# Patient Record
Sex: FEMALE | Race: WHITE | NOT HISPANIC OR LATINO | Employment: FULL TIME | URBAN - METROPOLITAN AREA
[De-identification: names, ages, dates, MRNs, and addresses within clinical notes are randomized per-mention and may not be internally consistent; named-entity substitution may affect disease eponyms.]

---

## 2020-11-13 ENCOUNTER — TRANSCRIBE ORDERS (OUTPATIENT)
Dept: ADMINISTRATIVE | Facility: HOSPITAL | Age: 55
End: 2020-11-13

## 2020-11-13 ENCOUNTER — TRANSCRIBE ORDERS (OUTPATIENT)
Dept: RADIOLOGY | Facility: HOSPITAL | Age: 55
End: 2020-11-13

## 2020-11-13 DIAGNOSIS — Z13.9 SCREENING FOR UNSPECIFIED CONDITION: ICD-10-CM

## 2020-11-13 DIAGNOSIS — Z13.1 SCREENING FOR DIABETES MELLITUS: ICD-10-CM

## 2020-11-13 DIAGNOSIS — Z13.6 SCREENING FOR CARDIOVASCULAR CONDITION: ICD-10-CM

## 2020-11-13 DIAGNOSIS — Z13.21 SCREENING FOR ENDOCRINE, NUTRITIONAL, METABOLIC AND IMMUNITY DISORDER: ICD-10-CM

## 2020-11-13 DIAGNOSIS — Z12.31 OTHER SCREENING MAMMOGRAM: Primary | ICD-10-CM

## 2020-11-13 DIAGNOSIS — R94.31 ABNORMAL ELECTROCARDIOGRAM: Primary | ICD-10-CM

## 2020-11-13 DIAGNOSIS — Z13.0 SCREENING FOR ENDOCRINE, NUTRITIONAL, METABOLIC AND IMMUNITY DISORDER: ICD-10-CM

## 2020-11-13 DIAGNOSIS — Z13.228 SCREENING FOR ENDOCRINE, NUTRITIONAL, METABOLIC AND IMMUNITY DISORDER: ICD-10-CM

## 2020-11-13 DIAGNOSIS — Z13.29 SCREENING FOR ENDOCRINE, NUTRITIONAL, METABOLIC AND IMMUNITY DISORDER: ICD-10-CM

## 2020-11-16 ENCOUNTER — LAB (OUTPATIENT)
Dept: LAB | Facility: HOSPITAL | Age: 55
End: 2020-11-16
Payer: COMMERCIAL

## 2020-11-16 DIAGNOSIS — Z13.29 SCREENING FOR ENDOCRINE, NUTRITIONAL, METABOLIC AND IMMUNITY DISORDER: ICD-10-CM

## 2020-11-16 DIAGNOSIS — Z13.0 SCREENING FOR ENDOCRINE, NUTRITIONAL, METABOLIC AND IMMUNITY DISORDER: ICD-10-CM

## 2020-11-16 DIAGNOSIS — Z13.1 SCREENING FOR DIABETES MELLITUS: ICD-10-CM

## 2020-11-16 DIAGNOSIS — Z13.228 SCREENING FOR ENDOCRINE, NUTRITIONAL, METABOLIC AND IMMUNITY DISORDER: ICD-10-CM

## 2020-11-16 DIAGNOSIS — Z13.21 SCREENING FOR ENDOCRINE, NUTRITIONAL, METABOLIC AND IMMUNITY DISORDER: ICD-10-CM

## 2020-11-16 DIAGNOSIS — Z13.6 SCREENING FOR CARDIOVASCULAR CONDITION: ICD-10-CM

## 2020-11-16 DIAGNOSIS — Z13.9 SCREENING FOR UNSPECIFIED CONDITION: ICD-10-CM

## 2020-11-16 LAB
25(OH)D3 SERPL-MCNC: 15.3 NG/ML (ref 30–100)
ALBUMIN SERPL BCP-MCNC: 4.4 G/DL (ref 3.4–4.8)
ALP SERPL-CCNC: 62.9 U/L (ref 35–140)
ALT SERPL W P-5'-P-CCNC: 20 U/L (ref 5–54)
ANION GAP SERPL CALCULATED.3IONS-SCNC: 8 MMOL/L (ref 4–13)
AST SERPL W P-5'-P-CCNC: 15 U/L (ref 15–41)
BASOPHILS # BLD AUTO: 0.03 THOUSANDS/ΜL (ref 0–0.1)
BASOPHILS NFR BLD AUTO: 1 % (ref 0–1)
BILIRUB SERPL-MCNC: 0.61 MG/DL (ref 0.3–1.2)
BUN SERPL-MCNC: 15 MG/DL (ref 6–20)
CALCIUM SERPL-MCNC: 9.5 MG/DL (ref 8.4–10.2)
CHLORIDE SERPL-SCNC: 104 MMOL/L (ref 96–108)
CHOLEST SERPL-MCNC: 162 MG/DL
CO2 SERPL-SCNC: 27 MMOL/L (ref 22–33)
CREAT SERPL-MCNC: 0.7 MG/DL (ref 0.4–1.1)
EOSINOPHIL # BLD AUTO: 0.14 THOUSAND/ΜL (ref 0–0.61)
EOSINOPHIL NFR BLD AUTO: 2 % (ref 0–6)
ERYTHROCYTE [DISTWIDTH] IN BLOOD BY AUTOMATED COUNT: 12.8 % (ref 11.6–15.1)
EST. AVERAGE GLUCOSE BLD GHB EST-MCNC: 105 MG/DL
GFR SERPL CREATININE-BSD FRML MDRD: 99 ML/MIN/1.73SQ M
GLUCOSE P FAST SERPL-MCNC: 85 MG/DL (ref 70–105)
HBA1C MFR BLD: 5.3 %
HCT VFR BLD AUTO: 47.3 % (ref 34.8–46.1)
HCV AB SER QL: NORMAL
HDLC SERPL-MCNC: 50 MG/DL
HGB BLD-MCNC: 15.8 G/DL (ref 11.5–15.4)
IMM GRANULOCYTES # BLD AUTO: 0.01 THOUSAND/UL (ref 0–0.2)
IMM GRANULOCYTES NFR BLD AUTO: 0 % (ref 0–2)
LDLC SERPL CALC-MCNC: 99 MG/DL (ref 0–100)
LYMPHOCYTES # BLD AUTO: 1.32 THOUSANDS/ΜL (ref 0.6–4.47)
LYMPHOCYTES NFR BLD AUTO: 21 % (ref 14–44)
MCH RBC QN AUTO: 30.8 PG (ref 26.8–34.3)
MCHC RBC AUTO-ENTMCNC: 33.4 G/DL (ref 31.4–37.4)
MCV RBC AUTO: 92 FL (ref 82–98)
MONOCYTES # BLD AUTO: 0.73 THOUSAND/ΜL (ref 0.17–1.22)
MONOCYTES NFR BLD AUTO: 12 % (ref 4–12)
NEUTROPHILS # BLD AUTO: 4.13 THOUSANDS/ΜL (ref 1.85–7.62)
NEUTS SEG NFR BLD AUTO: 64 % (ref 43–75)
NONHDLC SERPL-MCNC: 112 MG/DL
PLATELET # BLD AUTO: 243 THOUSANDS/UL (ref 149–390)
PMV BLD AUTO: 10.4 FL (ref 8.9–12.7)
POTASSIUM SERPL-SCNC: 4.3 MMOL/L (ref 3.5–5)
PROT SERPL-MCNC: 7.4 G/DL (ref 6.4–8.3)
RBC # BLD AUTO: 5.13 MILLION/UL (ref 3.81–5.12)
SODIUM SERPL-SCNC: 139 MMOL/L (ref 133–145)
TRIGL SERPL-MCNC: 64.6 MG/DL
TSH SERPL DL<=0.05 MIU/L-ACNC: 1.77 UIU/ML (ref 0.34–5.6)
WBC # BLD AUTO: 6.36 THOUSAND/UL (ref 4.31–10.16)

## 2020-11-16 PROCEDURE — 86803 HEPATITIS C AB TEST: CPT

## 2020-11-16 PROCEDURE — 36415 COLL VENOUS BLD VENIPUNCTURE: CPT

## 2020-11-16 PROCEDURE — 80053 COMPREHEN METABOLIC PANEL: CPT

## 2020-11-16 PROCEDURE — 85025 COMPLETE CBC W/AUTO DIFF WBC: CPT

## 2020-11-16 PROCEDURE — 84443 ASSAY THYROID STIM HORMONE: CPT

## 2020-11-16 PROCEDURE — 82306 VITAMIN D 25 HYDROXY: CPT

## 2020-11-16 PROCEDURE — 80061 LIPID PANEL: CPT

## 2020-11-16 PROCEDURE — 87389 HIV-1 AG W/HIV-1&-2 AB AG IA: CPT

## 2020-11-16 PROCEDURE — 83036 HEMOGLOBIN GLYCOSYLATED A1C: CPT

## 2020-11-17 LAB — HIV 1+2 AB+HIV1 P24 AG SERPL QL IA: NORMAL

## 2021-06-24 ENCOUNTER — APPOINTMENT (OUTPATIENT)
Dept: LAB | Facility: HOSPITAL | Age: 56
End: 2021-06-24
Payer: COMMERCIAL

## 2021-06-24 DIAGNOSIS — I10 HYPERTENSION, ESSENTIAL: ICD-10-CM

## 2021-06-24 LAB
ANION GAP SERPL CALCULATED.3IONS-SCNC: 8 MMOL/L (ref 4–13)
BUN SERPL-MCNC: 17 MG/DL (ref 6–20)
CALCIUM SERPL-MCNC: 9.4 MG/DL (ref 8.4–10.2)
CHLORIDE SERPL-SCNC: 104 MMOL/L (ref 96–108)
CO2 SERPL-SCNC: 25 MMOL/L (ref 22–33)
CREAT SERPL-MCNC: 0.74 MG/DL (ref 0.4–1.1)
GFR SERPL CREATININE-BSD FRML MDRD: 92 ML/MIN/1.73SQ M
GLUCOSE P FAST SERPL-MCNC: 98 MG/DL (ref 70–105)
MAGNESIUM SERPL-MCNC: 2 MG/DL (ref 1.6–2.6)
POTASSIUM SERPL-SCNC: 3.7 MMOL/L (ref 3.5–5)
SODIUM SERPL-SCNC: 137 MMOL/L (ref 133–145)

## 2021-06-24 PROCEDURE — 80048 BASIC METABOLIC PNL TOTAL CA: CPT

## 2021-06-24 PROCEDURE — 36415 COLL VENOUS BLD VENIPUNCTURE: CPT

## 2021-06-24 PROCEDURE — 83735 ASSAY OF MAGNESIUM: CPT

## 2021-09-08 ENCOUNTER — APPOINTMENT (OUTPATIENT)
Dept: LAB | Facility: HOSPITAL | Age: 56
End: 2021-09-08
Payer: COMMERCIAL

## 2021-09-08 DIAGNOSIS — F39 MILD MOOD DISORDER (HCC): ICD-10-CM

## 2021-09-08 LAB
ALBUMIN SERPL BCP-MCNC: 4.3 G/DL (ref 3.4–4.8)
ALP SERPL-CCNC: 50.3 U/L (ref 35–140)
ALT SERPL W P-5'-P-CCNC: 11 U/L (ref 5–54)
ANION GAP SERPL CALCULATED.3IONS-SCNC: 8 MMOL/L (ref 4–13)
AST SERPL W P-5'-P-CCNC: 17 U/L (ref 15–41)
BASOPHILS # BLD AUTO: 0.03 THOUSANDS/ΜL (ref 0–0.1)
BASOPHILS NFR BLD AUTO: 0 % (ref 0–1)
BILIRUB SERPL-MCNC: 0.99 MG/DL (ref 0.3–1.2)
BUN SERPL-MCNC: 15 MG/DL (ref 6–20)
CALCIUM SERPL-MCNC: 9.3 MG/DL (ref 8.4–10.2)
CHLORIDE SERPL-SCNC: 104 MMOL/L (ref 96–108)
CHOLEST SERPL-MCNC: 146 MG/DL
CO2 SERPL-SCNC: 26 MMOL/L (ref 22–33)
CREAT SERPL-MCNC: 0.84 MG/DL (ref 0.4–1.1)
EOSINOPHIL # BLD AUTO: 0.15 THOUSAND/ΜL (ref 0–0.61)
EOSINOPHIL NFR BLD AUTO: 2 % (ref 0–6)
ERYTHROCYTE [DISTWIDTH] IN BLOOD BY AUTOMATED COUNT: 12.9 % (ref 11.6–15.1)
EST. AVERAGE GLUCOSE BLD GHB EST-MCNC: 100 MG/DL
GFR SERPL CREATININE-BSD FRML MDRD: 79 ML/MIN/1.73SQ M
GLUCOSE P FAST SERPL-MCNC: 89 MG/DL (ref 70–105)
HBA1C MFR BLD: 5.1 %
HCT VFR BLD AUTO: 46.9 % (ref 34.8–46.1)
HDLC SERPL-MCNC: 40 MG/DL
HGB BLD-MCNC: 15.5 G/DL (ref 11.5–15.4)
IMM GRANULOCYTES # BLD AUTO: 0.01 THOUSAND/UL (ref 0–0.2)
IMM GRANULOCYTES NFR BLD AUTO: 0 % (ref 0–2)
LDLC SERPL CALC-MCNC: 96 MG/DL (ref 0–100)
LYMPHOCYTES # BLD AUTO: 1.88 THOUSANDS/ΜL (ref 0.6–4.47)
LYMPHOCYTES NFR BLD AUTO: 22 % (ref 14–44)
MCH RBC QN AUTO: 31.4 PG (ref 26.8–34.3)
MCHC RBC AUTO-ENTMCNC: 33 G/DL (ref 31.4–37.4)
MCV RBC AUTO: 95 FL (ref 82–98)
MONOCYTES # BLD AUTO: 0.61 THOUSAND/ΜL (ref 0.17–1.22)
MONOCYTES NFR BLD AUTO: 7 % (ref 4–12)
NEUTROPHILS # BLD AUTO: 5.77 THOUSANDS/ΜL (ref 1.85–7.62)
NEUTS SEG NFR BLD AUTO: 69 % (ref 43–75)
NONHDLC SERPL-MCNC: 106 MG/DL
PLATELET # BLD AUTO: 241 THOUSANDS/UL (ref 149–390)
PMV BLD AUTO: 10.7 FL (ref 8.9–12.7)
POTASSIUM SERPL-SCNC: 4.3 MMOL/L (ref 3.5–5)
PROT SERPL-MCNC: 7.2 G/DL (ref 6.4–8.3)
RBC # BLD AUTO: 4.94 MILLION/UL (ref 3.81–5.12)
SODIUM SERPL-SCNC: 138 MMOL/L (ref 133–145)
TRIGL SERPL-MCNC: 51.2 MG/DL
TSH SERPL DL<=0.05 MIU/L-ACNC: 1.04 UIU/ML (ref 0.34–5.6)
WBC # BLD AUTO: 8.45 THOUSAND/UL (ref 4.31–10.16)

## 2021-09-08 PROCEDURE — 85025 COMPLETE CBC W/AUTO DIFF WBC: CPT

## 2021-09-08 PROCEDURE — 80053 COMPREHEN METABOLIC PANEL: CPT

## 2021-09-08 PROCEDURE — 83036 HEMOGLOBIN GLYCOSYLATED A1C: CPT

## 2021-09-08 PROCEDURE — 84443 ASSAY THYROID STIM HORMONE: CPT

## 2021-09-08 PROCEDURE — 80061 LIPID PANEL: CPT

## 2021-09-08 PROCEDURE — 36415 COLL VENOUS BLD VENIPUNCTURE: CPT

## 2022-01-31 ENCOUNTER — HOSPITAL ENCOUNTER (EMERGENCY)
Facility: HOSPITAL | Age: 57
Discharge: HOME | End: 2022-01-31
Attending: INTERNAL MEDICINE | Admitting: INTERNAL MEDICINE
Payer: MEDICARE

## 2022-01-31 VITALS
DIASTOLIC BLOOD PRESSURE: 93 MMHG | BODY MASS INDEX: 27.31 KG/M2 | SYSTOLIC BLOOD PRESSURE: 186 MMHG | TEMPERATURE: 97.3 F | HEART RATE: 68 BPM | HEIGHT: 64 IN | WEIGHT: 160 LBS | OXYGEN SATURATION: 97 % | RESPIRATION RATE: 20 BRPM

## 2022-01-31 DIAGNOSIS — R11.2 NAUSEA AND VOMITING, INTRACTABILITY OF VOMITING NOT SPECIFIED, UNSPECIFIED VOMITING TYPE: Primary | ICD-10-CM

## 2022-01-31 LAB
ALBUMIN SERPL BCP-MCNC: 4.3 G/DL (ref 3.4–4.8)
ALP SERPL-CCNC: 54.4 U/L (ref 35–140)
ALT SERPL W P-5'-P-CCNC: 13 U/L (ref 5–54)
ANION GAP SERPL CALCULATED.3IONS-SCNC: 6 MMOL/L (ref 4–13)
AST SERPL W P-5'-P-CCNC: 15 U/L (ref 15–41)
BASOPHILS # BLD AUTO: 0.02 THOUSANDS/ΜL (ref 0–0.1)
BASOPHILS NFR BLD AUTO: 0 % (ref 0–1)
BILIRUB SERPL-MCNC: 1.25 MG/DL (ref 0.3–1.2)
BUN SERPL-MCNC: 11 MG/DL (ref 6–20)
CALCIUM SERPL-MCNC: 9.5 MG/DL (ref 8.4–10.2)
CHLORIDE SERPL-SCNC: 102 MMOL/L (ref 96–108)
CO2 SERPL-SCNC: 28 MMOL/L (ref 22–33)
CREAT SERPL-MCNC: 0.75 MG/DL (ref 0.4–1.1)
EOSINOPHIL # BLD AUTO: 0.11 THOUSAND/ΜL (ref 0–0.61)
EOSINOPHIL NFR BLD AUTO: 1 % (ref 0–6)
ERYTHROCYTE [DISTWIDTH] IN BLOOD BY AUTOMATED COUNT: 13.1 % (ref 11.6–15.1)
FLUAV RNA RESP QL NAA+PROBE: NEGATIVE
FLUBV RNA RESP QL NAA+PROBE: NEGATIVE
GFR SERPL CREATININE-BSD FRML MDRD: 90 ML/MIN/1.73SQ M
GLUCOSE SERPL-MCNC: 96 MG/DL (ref 65–140)
HCT VFR BLD AUTO: 45.5 % (ref 34.8–46.1)
HGB BLD-MCNC: 15.2 G/DL (ref 11.5–15.4)
IMM GRANULOCYTES # BLD AUTO: 0.02 THOUSAND/UL (ref 0–0.2)
IMM GRANULOCYTES NFR BLD AUTO: 0 % (ref 0–2)
LIPASE SERPL-CCNC: 14 U/L (ref 13–60)
LYMPHOCYTES # BLD AUTO: 1.59 THOUSANDS/ΜL (ref 0.6–4.47)
LYMPHOCYTES NFR BLD AUTO: 19 % (ref 14–44)
MCH RBC QN AUTO: 30.5 PG (ref 26.8–34.3)
MCHC RBC AUTO-ENTMCNC: 33.4 G/DL (ref 31.4–37.4)
MCV RBC AUTO: 91 FL (ref 82–98)
MONOCYTES # BLD AUTO: 0.58 THOUSAND/ΜL (ref 0.17–1.22)
MONOCYTES NFR BLD AUTO: 7 % (ref 4–12)
NEUTROPHILS # BLD AUTO: 5.98 THOUSANDS/ΜL (ref 1.85–7.62)
NEUTS SEG NFR BLD AUTO: 73 % (ref 43–75)
NRBC BLD AUTO-RTO: 0 /100 WBCS
PLATELET # BLD AUTO: 214 THOUSANDS/UL (ref 149–390)
PMV BLD AUTO: 9.6 FL (ref 8.9–12.7)
POTASSIUM SERPL-SCNC: 4 MMOL/L (ref 3.5–5)
PROT SERPL-MCNC: 7.2 G/DL (ref 6.4–8.3)
RBC # BLD AUTO: 4.98 MILLION/UL (ref 3.81–5.12)
SARS-COV-2 AG UPPER RESP QL IA: NORMAL
SARS-COV-2 RNA RESP QL NAA+PROBE: NEGATIVE
SODIUM SERPL-SCNC: 136 MMOL/L (ref 133–145)
WBC # BLD AUTO: 8.3 THOUSAND/UL (ref 4.31–10.16)

## 2022-01-31 PROCEDURE — 87636 SARSCOV2 & INF A&B AMP PRB: CPT | Performed by: PHYSICIAN ASSISTANT

## 2022-01-31 PROCEDURE — 83690 ASSAY OF LIPASE: CPT | Performed by: PHYSICIAN ASSISTANT

## 2022-01-31 PROCEDURE — 80053 COMPREHEN METABOLIC PANEL: CPT | Performed by: PHYSICIAN ASSISTANT

## 2022-01-31 PROCEDURE — 99283 EMERGENCY DEPT VISIT LOW MDM: CPT

## 2022-01-31 PROCEDURE — 85025 COMPLETE CBC W/AUTO DIFF WBC: CPT | Performed by: PHYSICIAN ASSISTANT

## 2022-01-31 PROCEDURE — 99284 EMERGENCY DEPT VISIT MOD MDM: CPT | Performed by: PHYSICIAN ASSISTANT

## 2022-01-31 PROCEDURE — 36415 COLL VENOUS BLD VENIPUNCTURE: CPT | Performed by: PHYSICIAN ASSISTANT

## 2022-01-31 RX ORDER — ONDANSETRON 4 MG/1
4 TABLET, ORALLY DISINTEGRATING ORAL EVERY 6 HOURS PRN
Qty: 12 TABLET | Refills: 0 | OUTPATIENT
Start: 2022-01-31 | End: 2022-07-09

## 2022-01-31 RX ORDER — ONDANSETRON 4 MG/1
4 TABLET, ORALLY DISINTEGRATING ORAL ONCE
Status: COMPLETED | OUTPATIENT
Start: 2022-01-31 | End: 2022-01-31

## 2022-01-31 RX ADMIN — ONDANSETRON 4 MG: 4 TABLET, ORALLY DISINTEGRATING ORAL at 09:13

## 2022-01-31 NOTE — DISCHARGE INSTRUCTIONS
Please return to the emergency department for worsening symptoms including chest pain, shortness of breath, dizziness, lightheadedness, fever greater than 103, severe pain, inability to walk, fainting episodes, etc  Please follow-up with your family practice provider as soon as possible  You have been tested for COVID-19 here in the emergency department  Your results will return in approximately 48 hours  Please remain quarantined until that time  If you test positive for COVID-19, you will be required to quarantine for 5 days thereafter  Please await your viral testing  We will call you with results or you may receive them on the mobile application  I have sent a medication over to your pharmacy for nausea  Please place this under your tongue for it to take effect  Please follow-up with your family practice provider as soon as possible

## 2022-01-31 NOTE — Clinical Note
Maribel Jose was seen and treated in our emergency department on 1/31/2022  Diagnosis:     Rivas Christina  is off the rest of the shift today  She may return on this date:     Please excuse this individual from her duties from January 31, 2022 until February 2, 2022  She will be required to quarantine until that date  Please call with questions or concerns  If you have any questions or concerns, please don't hesitate to call        Nogzi Browning PA-C    ______________________________           _______________          _______________  Hospital Representative                              Date                                Time

## 2022-02-01 NOTE — ED PROVIDER NOTES
History  Chief Complaint   Patient presents with    Vomiting     pt states she was on her way to work when she got extremely nauseated and started vomiting, feels hot  denies abd pain, N/V/D prior to this morning  This is a 73-year-old female with past medical history significant for hypertension and anxiety presenting to the emergency department today for sudden onset nausea and vomiting  She had 3 episodes while she was in the car on the way to work  Her vomit was nonbloody and nonbilious  She has taken no medications prior to arrival   She denies any abdominal pain, chest pain, or shortness of breath  No urinary symptoms, fever, or chills  She is not vaccinated  She has a prior abdominal surgical history of a cholecystectomy  She had a normal bowel movement this morning and is able to pass gas without difficulty  The patient denies lower extremity swelling or pain  The patient denies other complaints at this time  History provided by:  Patient   used: No    Vomiting  Severity:  Mild  Number of daily episodes:  3  Quality:  Stomach contents  Able to tolerate:  Liquids and solids  Progression:  Resolved  Chronicity:  New  Recent urination:  Normal  Relieved by:  None tried  Worsened by:  Nothing  Ineffective treatments:  None tried  Associated symptoms: no abdominal pain, no arthralgias, no chills, no cough, no diarrhea, no fever, no headaches, no myalgias, no sore throat and no URI    Risk factors: prior abdominal surgery    Risk factors: not pregnant and no suspect food intake        None       Past Medical History:   Diagnosis Date    Hypertension     Psychiatric disorder        Past Surgical History:   Procedure Laterality Date    ABDOMINAL SURGERY      gallbladder removal       History reviewed  No pertinent family history  I have reviewed and agree with the history as documented      E-Cigarette/Vaping    E-Cigarette Use Never User      E-Cigarette/Vaping Substances Social History     Tobacco Use    Smoking status: Current Every Day Smoker     Packs/day: 0 50    Smokeless tobacco: Never Used   Vaping Use    Vaping Use: Never used   Substance Use Topics    Alcohol use: Not Currently    Drug use: Never       Review of Systems   Constitutional: Negative for chills, diaphoresis and fever  HENT: Negative for sore throat  Eyes: Negative for visual disturbance  Respiratory: Negative for cough, chest tightness, shortness of breath and wheezing  Cardiovascular: Negative for chest pain  Gastrointestinal: Positive for nausea and vomiting  Negative for abdominal pain, constipation and diarrhea  Genitourinary: Negative for dysuria  Musculoskeletal: Negative for arthralgias, myalgias, neck pain and neck stiffness  Skin: Negative for rash and wound  Neurological: Negative for dizziness, seizures, syncope, weakness, light-headedness, numbness and headaches  Psychiatric/Behavioral: Negative for confusion  All other systems reviewed and are negative  Physical Exam  Physical Exam  Vitals and nursing note reviewed  Constitutional:       General: She is not in acute distress  Appearance: Normal appearance  She is normal weight  She is not ill-appearing, toxic-appearing or diaphoretic  HENT:      Head: Normocephalic and atraumatic  Nose: Nose normal  No congestion or rhinorrhea  Mouth/Throat:      Mouth: Mucous membranes are moist       Pharynx: No oropharyngeal exudate or posterior oropharyngeal erythema  Eyes:      General: No scleral icterus  Right eye: No discharge  Left eye: No discharge  Extraocular Movements: Extraocular movements intact  Conjunctiva/sclera: Conjunctivae normal    Cardiovascular:      Rate and Rhythm: Normal rate and regular rhythm  Pulses: Normal pulses  Heart sounds: Normal heart sounds  No murmur heard  No friction rub  No gallop         Comments: 2+ radial pulses bilaterally  Pulmonary:      Effort: Pulmonary effort is normal  No respiratory distress  Breath sounds: Normal breath sounds  No stridor  No wheezing, rhonchi or rales  Comments: CTA bilaterally without adventitious breath sounds  Chest:      Chest wall: No tenderness  Abdominal:      General: Abdomen is flat  There is no distension  Palpations: Abdomen is soft  Tenderness: There is no abdominal tenderness  There is no right CVA tenderness, left CVA tenderness, guarding or rebound  Comments: Soft, nontender, nondistended, no organomegaly   Musculoskeletal:         General: Normal range of motion  Right lower leg: No edema  Left lower leg: No edema  Comments: Negative Lorrie sign bilaterally   Skin:     General: Skin is warm and dry  Capillary Refill: Capillary refill takes less than 2 seconds  Coloration: Skin is not jaundiced or pale  Neurological:      General: No focal deficit present  Mental Status: She is alert and oriented to person, place, and time  Mental status is at baseline     Psychiatric:         Mood and Affect: Mood normal          Behavior: Behavior normal          Vital Signs  ED Triage Vitals   Temperature Pulse Respirations Blood Pressure SpO2   01/31/22 0849 01/31/22 0850 01/31/22 0850 01/31/22 0850 01/31/22 0850   (!) 97 3 °F (36 3 °C) 68 20 (!) 186/93 97 %      Temp Source Heart Rate Source Patient Position - Orthostatic VS BP Location FiO2 (%)   01/31/22 0849 01/31/22 0850 01/31/22 0850 01/31/22 0850 --   Oral Monitor Sitting Right arm       Pain Score       --                  Vitals:    01/31/22 0850   BP: (!) 186/93   Pulse: 68   Patient Position - Orthostatic VS: Sitting         Visual Acuity      ED Medications  Medications   ondansetron (ZOFRAN-ODT) dispersible tablet 4 mg (4 mg Oral Given 1/31/22 0913)       Diagnostic Studies  Results Reviewed     Procedure Component Value Units Date/Time    Covid19 and INFLUENZA A/B PCR [[id number]]  (Normal) Collected: 01/31/22 0915    Lab Status: Final result Specimen: Nares from Nose Updated: 01/31/22 2211     SARS-CoV-2 Negative     INFLUENZA A PCR Negative     INFLUENZA B PCR Negative    Narrative:      FOR PEDIATRIC PATIENTS - copy/paste COVID Guidelines URL to browser: [email or web address]    SARS-CoV-2 assay is a Nucleic Acid Amplification assay intended for the  qualitative detection of nucleic acid from SARS-CoV-2 in nasopharyngeal  swabs  Results are for the presumptive identification of SARS-CoV-2 RNA  Positive results are indicative of infection with SARS-CoV-2, the virus  causing COVID-19, but do not rule out bacterial infection or co-infection  with other viruses  Laboratories within the [place] and its  territories are required to report all positive results to the appropriate  public health authorities  Negative results do not preclude SARS-CoV-2  infection and should not be used as the sole basis for treatment or other  patient management decisions  Negative results must be combined with  clinical observations, patient history, and epidemiological information  This test has not been FDA cleared or approved  This test has been authorized by FDA under an Emergency Use Authorization  (EUA)  This test is only authorized for the duration of time the  declaration that circumstances exist justifying the authorization of the  emergency use of an in vitro diagnostic tests for detection of SARS-CoV-2  virus and/or diagnosis of COVID-19 infection under section 564(b)(1) of  the Act, 21 U  S C  729SNV-7(O)(7), unless the authorization is terminated  or revoked sooner  The test has been validated but independent review by FDA  and CLIA is pending  Test performed using the Roche carlo [id number] System: This RT-PCR assay  targets ORF1, a region unique to SARS-CoV-2   A conserved region in the  E-gene was chosen for pan-Sarbecovirus detection which includes  SARS-CoV-2        COVID Rapid Antigen [[id number]]  (Normal) Collected: 01/31/22 0915    Lab Status: Final result Specimen: Nares from Nose Updated: 01/31/22 0944     SARS-CoV-2 Ag Negative Presumptive    Comprehensive metabolic panel [[id number]]  (Abnormal) Collected: 01/31/22 0913    Lab Status: Final result Specimen: Blood from Arm, Right Updated: 01/31/22 0938     Sodium 136 mmol/L      Potassium 4 0 mmol/L      Chloride 102 mmol/L      CO2 28 mmol/L      ANION GAP 6 mmol/L      BUN 11 mg/dL      Creatinine 0 75 mg/dL      Glucose 96 mg/dL      Calcium 9 5 mg/dL      AST 15 U/L      ALT 13 U/L      Alkaline Phosphatase 54 4 U/L      Total Protein 7 2 g/dL      Albumin 4 3 g/dL      Total Bilirubin 1 25 mg/dL      eGFR 90 ml/min/1 73sq m     Narrative:      National Kidney Disease Foundation guidelines for Chronic Kidney Disease (CKD):     Stage 1 with normal or high GFR (GFR > 90 mL/min/1 73 square meters)    Stage 2 Mild CKD (GFR = 60-89 mL/min/1 73 square meters)    Stage 3A Moderate CKD (GFR = 45-59 mL/min/1 73 square meters)    Stage 3B Moderate CKD (GFR = 30-44 mL/min/1 73 square meters)    Stage 4 Severe CKD (GFR = 15-29 mL/min/1 73 square meters)    Stage 5 End Stage CKD (GFR <15 mL/min/1 73 square meters)  Note: GFR calculation is accurate only with a steady state creatinine    Lipase [[id number]]  (Normal) Collected: 01/31/22 0913    Lab Status: Final result Specimen: Blood from Arm, Right Updated: 01/31/22 0938     Lipase 14 u/L     CBC and differential [[id number]] Collected: 01/31/22 0913    Lab Status: Final result Specimen: Blood from Arm, Right Updated: 01/31/22 0917     WBC 8 30 Thousand/uL      RBC 4 98 Million/uL      Hemoglobin 15 2 g/dL      Hematocrit 45 5 %      MCV 91 fL      MCH 30 5 pg      MCHC 33 4 g/dL      RDW 13 1 %      MPV 9 6 fL      Platelets 336 Thousands/uL      nRBC 0 /100 WBCs      Neutrophils Relative 73 %      Immat GRANS % 0 %      Lymphocytes Relative 19 %      Monocytes Relative 7 %      Eosinophils Relative 1 %      Basophils Relative 0 %      Neutrophils Absolute 5 98 Thousands/µL      Immature Grans Absolute 0 02 Thousand/uL      Lymphocytes Absolute 1 59 Thousands/µL      Monocytes Absolute 0 58 Thousand/µL      Eosinophils Absolute 0 11 Thousand/µL      Basophils Absolute 0 02 Thousands/µL                  No orders to display              Procedures  Procedures         ED Course                                             MDM  Number of Diagnoses or Management Options  Nausea and vomiting, intractability of vomiting not specified, unspecified vomiting type: new and requires workup  Diagnosis management comments: This is a 61-year-old female presenting to the emergency department today for 3 episodes of nausea and vomiting earlier today  They have since resolved  She has no abdominal pain  Vomit was nonbilious and nonbloody  Patient had a normal bowel movement this morning and is passing gas therefore there is low suspicion for small-bowel obstruction  Patient was given under the tongue Zofran here in the emergency department  Labs are all within normal limits  Patient notes no nausea and no episodes of vomiting here in the emergency department  The patient is stable for discharge at this time  Zofran sent to the patient's pharmacy  Strict return precautions were given  Recommend PCP follow-up as soon as possible  The patient verifies understanding and agrees to the plan at this time  All questions answered to the patient's satisfaction         Amount and/or Complexity of Data Reviewed  Clinical lab tests: ordered and reviewed  Independent visualization of images, tracings, or specimens: yes    Patient Progress  Patient progress: stable      Disposition  Final diagnoses:   Nausea and vomiting, intractability of vomiting not specified, unspecified vomiting type     Time reflects when diagnosis was documented in both MDM as applicable and the Disposition within this note     Time User Action Codes Description Comment    2022  9:48 AM Dalila Garay Add [R11 2] Nausea and vomiting, intractability of vomiting not specified, unspecified vomiting type       ED Disposition     ED Disposition Condition Date/Time Comment    Discharge Stable   9:48 AM Durga Teofilo discharge to home/self care  Follow-up Information     Follow up With Specialties Details Why Contact Info Additional Information    Nia Aguila MD Family Medicine Schedule an appointment as soon as possible for a visit   [address] [facility]   [address] [facility] Emergency Department Emergency Medicine Go to  If symptoms worsen [address] [facility],[address] [id number]  [address] [place] [address] Emergency Department, [address], [address] [facility] [address]          Discharge Medication List as of 2022  9:51 AM      START taking these medications    Details   ondansetron (Zofran ODT) 4 mg disintegrating tablet Take 1 tablet (4 mg total) by mouth every 6 (six) hours as needed for nausea or vomiting, Starting Mon 2022, Normal             No discharge procedures on file      PDMP Review     None          ED Provider  Electronically Signed by           Janie Jean PA-C  [phone]

## 2022-07-08 ENCOUNTER — HOSPITAL ENCOUNTER (EMERGENCY)
Facility: HOSPITAL | Age: 57
Discharge: HOME | End: 2022-07-09
Attending: EMERGENCY MEDICINE
Payer: MEDICARE

## 2022-07-08 DIAGNOSIS — R03.0 ELEVATED BLOOD PRESSURE READING: ICD-10-CM

## 2022-07-08 DIAGNOSIS — R11.0 NAUSEA: Primary | ICD-10-CM

## 2022-07-08 DIAGNOSIS — N39.0 UTI (URINARY TRACT INFECTION): ICD-10-CM

## 2022-07-08 LAB
EXT PREG TEST URINE: NEGATIVE
EXT. CONTROL ED NAV: NORMAL

## 2022-07-08 PROCEDURE — 96360 HYDRATION IV INFUSION INIT: CPT

## 2022-07-08 PROCEDURE — 36415 COLL VENOUS BLD VENIPUNCTURE: CPT | Performed by: STUDENT IN AN ORGANIZED HEALTH CARE EDUCATION/TRAINING PROGRAM

## 2022-07-08 PROCEDURE — 81025 URINE PREGNANCY TEST: CPT | Performed by: STUDENT IN AN ORGANIZED HEALTH CARE EDUCATION/TRAINING PROGRAM

## 2022-07-08 PROCEDURE — 80053 COMPREHEN METABOLIC PANEL: CPT | Performed by: STUDENT IN AN ORGANIZED HEALTH CARE EDUCATION/TRAINING PROGRAM

## 2022-07-08 PROCEDURE — 99284 EMERGENCY DEPT VISIT MOD MDM: CPT | Performed by: STUDENT IN AN ORGANIZED HEALTH CARE EDUCATION/TRAINING PROGRAM

## 2022-07-08 PROCEDURE — 83690 ASSAY OF LIPASE: CPT | Performed by: STUDENT IN AN ORGANIZED HEALTH CARE EDUCATION/TRAINING PROGRAM

## 2022-07-08 PROCEDURE — 99283 EMERGENCY DEPT VISIT LOW MDM: CPT

## 2022-07-08 PROCEDURE — 85025 COMPLETE CBC W/AUTO DIFF WBC: CPT | Performed by: STUDENT IN AN ORGANIZED HEALTH CARE EDUCATION/TRAINING PROGRAM

## 2022-07-08 RX ORDER — LISINOPRIL 2.5 MG/1
2.5 TABLET ORAL DAILY
COMMUNITY
End: 2022-07-17

## 2022-07-08 RX ORDER — LISINOPRIL 5 MG/1
2.5 TABLET ORAL ONCE
Status: COMPLETED | OUTPATIENT
Start: 2022-07-08 | End: 2022-07-08

## 2022-07-08 RX ORDER — ONDANSETRON 4 MG/1
4 TABLET, ORALLY DISINTEGRATING ORAL ONCE
Status: COMPLETED | OUTPATIENT
Start: 2022-07-08 | End: 2022-07-08

## 2022-07-08 RX ADMIN — ONDANSETRON 4 MG: 4 TABLET, ORALLY DISINTEGRATING ORAL at 23:49

## 2022-07-08 RX ADMIN — SODIUM CHLORIDE 1000 ML: 0.9 INJECTION, SOLUTION INTRAVENOUS at 23:57

## 2022-07-08 RX ADMIN — LISINOPRIL 2.5 MG: 5 TABLET ORAL at 23:48

## 2022-07-09 VITALS
SYSTOLIC BLOOD PRESSURE: 186 MMHG | HEART RATE: 78 BPM | TEMPERATURE: 97.9 F | RESPIRATION RATE: 18 BRPM | BODY MASS INDEX: 27.31 KG/M2 | OXYGEN SATURATION: 95 % | DIASTOLIC BLOOD PRESSURE: 105 MMHG | HEIGHT: 64 IN | WEIGHT: 160 LBS

## 2022-07-09 LAB
ALBUMIN SERPL BCP-MCNC: 4.3 G/DL (ref 3.5–5)
ALP SERPL-CCNC: 54 U/L (ref 34–104)
ALT SERPL W P-5'-P-CCNC: 10 U/L (ref 7–52)
ANION GAP SERPL CALCULATED.3IONS-SCNC: 7 MMOL/L (ref 4–13)
AST SERPL W P-5'-P-CCNC: 13 U/L (ref 13–39)
BACTERIA UR QL AUTO: ABNORMAL /HPF
BASOPHILS # BLD AUTO: 0.03 THOUSANDS/ΜL (ref 0–0.1)
BASOPHILS NFR BLD AUTO: 0 % (ref 0–1)
BILIRUB SERPL-MCNC: 1.02 MG/DL (ref 0.2–1)
BILIRUB UR QL STRIP: NEGATIVE
BUN SERPL-MCNC: 12 MG/DL (ref 5–25)
CALCIUM SERPL-MCNC: 9.4 MG/DL (ref 8.4–10.2)
CHLORIDE SERPL-SCNC: 106 MMOL/L (ref 96–108)
CLARITY UR: CLEAR
CO2 SERPL-SCNC: 28 MMOL/L (ref 21–32)
COLOR UR: YELLOW
CREAT SERPL-MCNC: 0.7 MG/DL (ref 0.6–1.3)
EOSINOPHIL # BLD AUTO: 0.18 THOUSAND/ΜL (ref 0–0.61)
EOSINOPHIL NFR BLD AUTO: 2 % (ref 0–6)
ERYTHROCYTE [DISTWIDTH] IN BLOOD BY AUTOMATED COUNT: 12.3 % (ref 11.6–15.1)
GFR SERPL CREATININE-BSD FRML MDRD: 97 ML/MIN/1.73SQ M
GLUCOSE SERPL-MCNC: 96 MG/DL (ref 65–140)
GLUCOSE UR STRIP-MCNC: NEGATIVE MG/DL
HCT VFR BLD AUTO: 42.9 % (ref 34.8–46.1)
HGB BLD-MCNC: 14.6 G/DL (ref 11.5–15.4)
HGB UR QL STRIP.AUTO: NEGATIVE
IMM GRANULOCYTES # BLD AUTO: 0.02 THOUSAND/UL (ref 0–0.2)
IMM GRANULOCYTES NFR BLD AUTO: 0 % (ref 0–2)
KETONES UR STRIP-MCNC: NEGATIVE MG/DL
LEUKOCYTE ESTERASE UR QL STRIP: ABNORMAL
LIPASE SERPL-CCNC: 14 U/L (ref 11–82)
LYMPHOCYTES # BLD AUTO: 2 THOUSANDS/ΜL (ref 0.6–4.47)
LYMPHOCYTES NFR BLD AUTO: 25 % (ref 14–44)
MCH RBC QN AUTO: 31.7 PG (ref 26.8–34.3)
MCHC RBC AUTO-ENTMCNC: 34 G/DL (ref 31.4–37.4)
MCV RBC AUTO: 93 FL (ref 82–98)
MONOCYTES # BLD AUTO: 0.6 THOUSAND/ΜL (ref 0.17–1.22)
MONOCYTES NFR BLD AUTO: 8 % (ref 4–12)
MUCOUS THREADS UR QL AUTO: ABNORMAL
NEUTROPHILS # BLD AUTO: 5.08 THOUSANDS/ΜL (ref 1.85–7.62)
NEUTS SEG NFR BLD AUTO: 65 % (ref 43–75)
NITRITE UR QL STRIP: NEGATIVE
NON-SQ EPI CELLS URNS QL MICRO: ABNORMAL /HPF
NRBC BLD AUTO-RTO: 0 /100 WBCS
PH UR STRIP.AUTO: 6 [PH]
PLATELET # BLD AUTO: 224 THOUSANDS/UL (ref 149–390)
PMV BLD AUTO: 10.1 FL (ref 8.9–12.7)
POTASSIUM SERPL-SCNC: 3.2 MMOL/L (ref 3.5–5.3)
PROT SERPL-MCNC: 7 G/DL (ref 6.4–8.4)
PROT UR STRIP-MCNC: NEGATIVE MG/DL
RBC # BLD AUTO: 4.6 MILLION/UL (ref 3.81–5.12)
RBC #/AREA URNS AUTO: ABNORMAL /HPF
SODIUM SERPL-SCNC: 141 MMOL/L (ref 135–147)
SP GR UR STRIP.AUTO: >=1.03 (ref 1–1.03)
UROBILINOGEN UR QL STRIP.AUTO: 0.2 E.U./DL
WBC # BLD AUTO: 7.91 THOUSAND/UL (ref 4.31–10.16)
WBC #/AREA URNS AUTO: ABNORMAL /HPF

## 2022-07-09 PROCEDURE — 81001 URINALYSIS AUTO W/SCOPE: CPT | Performed by: STUDENT IN AN ORGANIZED HEALTH CARE EDUCATION/TRAINING PROGRAM

## 2022-07-09 RX ORDER — POTASSIUM CHLORIDE 20 MEQ/1
40 TABLET, EXTENDED RELEASE ORAL ONCE
Status: COMPLETED | OUTPATIENT
Start: 2022-07-09 | End: 2022-07-09

## 2022-07-09 RX ORDER — NITROFURANTOIN 25; 75 MG/1; MG/1
100 CAPSULE ORAL 2 TIMES DAILY
Qty: 10 CAPSULE | Refills: 0 | Status: SHIPPED | OUTPATIENT
Start: 2022-07-09 | End: 2022-07-17

## 2022-07-09 RX ORDER — ONDANSETRON 4 MG/1
4 TABLET, ORALLY DISINTEGRATING ORAL EVERY 6 HOURS PRN
Qty: 5 TABLET | Refills: 0 | Status: SHIPPED | OUTPATIENT
Start: 2022-07-09

## 2022-07-09 RX ORDER — NITROFURANTOIN 25; 75 MG/1; MG/1
100 CAPSULE ORAL ONCE
Status: COMPLETED | OUTPATIENT
Start: 2022-07-09 | End: 2022-07-09

## 2022-07-09 RX ADMIN — NITROFURANTOIN (MONOHYDRATE/MACROCRYSTALS) 100 MG: 75; 25 CAPSULE ORAL at 00:38

## 2022-07-09 RX ADMIN — POTASSIUM CHLORIDE 40 MEQ: 1500 TABLET, EXTENDED RELEASE ORAL at 00:38

## 2022-07-09 NOTE — DISCHARGE INSTRUCTIONS
Please take all 5 days of macrobid for your UTI  Can take zofran every 6 hours as needed for nausea  Please ensure adequate hydration  Please follow up with your PCP to ensure resolution of symptoms  Please return to the ED with any new or worsening symptoms

## 2022-07-09 NOTE — ED PROVIDER NOTES
History  Chief Complaint   Patient presents with    Nausea     Patient reports nausea x1 day, increased thirst, and dark urine  Denies vomiting  Reports intermittent diarrhea     PMHx: HTN  PSH: cholecystectomy    Milagros Garcia is a 56year old female who presents to the ED with nausea and intermittent brown non-bloody diarrhea that began yesterday  Patient also notes dark colored urine, states feels she is unable to drink enough water, reports intermittent dysuria, denies current  BP noted to 190/110, states take 2 5 lisinopril daily but did not take it this morning, denies HA, visual disturbance, SOB, CP  Patient denies sick contacts, eating anything out of the ordinary, recent travel, and recent abx use  Denies fever/chills, lightheadedness, syncope, abdominal pain, vomiting, melena, blood in stool, or any other concerns at this time  History provided by:  Patient and medical records   used: No        Prior to Admission Medications   Prescriptions Last Dose Informant Patient Reported? Taking?   lisinopril (ZESTRIL) 2 5 mg tablet Past Week at Unknown time Self Yes Yes   Sig: Take 2 5 mg by mouth daily   ondansetron (Zofran ODT) 4 mg disintegrating tablet   No No   Sig: Take 1 tablet (4 mg total) by mouth every 6 (six) hours as needed for nausea or vomiting      Facility-Administered Medications: None       Past Medical History:   Diagnosis Date    Hypertension     Psychiatric disorder        Past Surgical History:   Procedure Laterality Date    ABDOMINAL SURGERY      gallbladder removal       History reviewed  No pertinent family history  I have reviewed and agree with the history as documented      E-Cigarette/Vaping    E-Cigarette Use Never User      E-Cigarette/Vaping Substances     Social History     Tobacco Use    Smoking status: Current Every Day Smoker     Packs/day: 0 50     Types: Cigarettes    Smokeless tobacco: Never Used   Vaping Use    Vaping Use: Never used   Substance Use Topics    Alcohol use: Not Currently    Drug use: Yes     Types: Marijuana     Comment: last used 07/07/22       Review of Systems   Constitutional: Negative for activity change, appetite change, chills and fever  HENT: Negative for congestion, drooling, ear pain, sore throat and trouble swallowing  Eyes: Negative for pain and visual disturbance  Respiratory: Negative for cough and shortness of breath  Cardiovascular: Negative for chest pain and palpitations  Gastrointestinal: Positive for diarrhea and nausea  Negative for abdominal pain and vomiting  Genitourinary: Positive for dysuria (see HPI)  Negative for flank pain, frequency and pelvic pain  Musculoskeletal: Negative for arthralgias, back pain, gait problem and neck pain  Skin: Negative for color change and rash  Neurological: Negative for syncope, light-headedness and headaches  All other systems reviewed and are negative  Physical Exam  Physical Exam  Vitals and nursing note reviewed  Constitutional:       General: She is not in acute distress  Appearance: Normal appearance  She is well-developed  She is not ill-appearing  Comments: Well appearing, speaking in full sentences, resting comfortably on stretcher, VSS   HENT:      Head: Normocephalic and atraumatic  Right Ear: External ear normal       Left Ear: External ear normal       Nose: Nose normal  No congestion or rhinorrhea  Mouth/Throat:      Mouth: Mucous membranes are moist    Eyes:      General:         Right eye: No discharge  Left eye: No discharge  Conjunctiva/sclera: Conjunctivae normal    Cardiovascular:      Rate and Rhythm: Normal rate and regular rhythm  Heart sounds: No murmur heard  No friction rub  No gallop  Pulmonary:      Effort: Pulmonary effort is normal  No respiratory distress  Breath sounds: Normal breath sounds  No stridor  No wheezing, rhonchi or rales  Abdominal:      General: Abdomen is flat  Bowel sounds are normal  There is no distension  Palpations: Abdomen is soft  Tenderness: There is no abdominal tenderness  There is no right CVA tenderness, left CVA tenderness, guarding or rebound  Genitourinary:     Comments: Deferred  Musculoskeletal:         General: No deformity or signs of injury  Cervical back: Normal range of motion and neck supple  Skin:     General: Skin is warm and dry  Capillary Refill: Capillary refill takes less than 2 seconds  Findings: No bruising, erythema or rash  Neurological:      General: No focal deficit present  Mental Status: She is alert and oriented to person, place, and time     Psychiatric:         Mood and Affect: Mood normal          Vital Signs  ED Triage Vitals [07/08/22 2302]   Temperature Pulse Respirations Blood Pressure SpO2   97 9 °F (36 6 °C) 95 18 (!) 190/110 96 %      Temp Source Heart Rate Source Patient Position - Orthostatic VS BP Location FiO2 (%)   Oral Monitor Lying Left arm --      Pain Score       --           Vitals:    07/08/22 2302 07/08/22 2347 07/09/22 0037   BP: (!) 190/110 (!) 180/108 (!) 186/105   Pulse: 95 98 78   Patient Position - Orthostatic VS: Lying Sitting Lying         Visual Acuity      ED Medications  Medications   ondansetron (ZOFRAN-ODT) dispersible tablet 4 mg (4 mg Oral Given 7/8/22 2349)   sodium chloride 0 9 % bolus 1,000 mL (0 mL Intravenous Stopped 7/9/22 0106)   lisinopril (ZESTRIL) tablet 2 5 mg (2 5 mg Oral Given 7/8/22 2348)   nitrofurantoin (MACROBID) extended-release capsule 100 mg (100 mg Oral Given 7/9/22 0038)   potassium chloride (K-DUR,KLOR-CON) CR tablet 40 mEq (40 mEq Oral Given 7/9/22 0038)       Diagnostic Studies  Results Reviewed     Procedure Component Value Units Date/Time    Lipase [[id number]]  (Normal) Collected: 07/08/22 2357    Lab Status: Final result Specimen: Blood from Arm, Right Updated: 07/09/22 0026     Lipase 14 u/L     Comprehensive metabolic panel [[id number]] (Abnormal) Collected: 07/08/22 2357    Lab Status: Final result Specimen: Blood from Arm, Right Updated: 07/09/22 0026     Sodium 141 mmol/L      Potassium 3 2 mmol/L      Chloride 106 mmol/L      CO2 28 mmol/L      ANION GAP 7 mmol/L      BUN 12 mg/dL      Creatinine 0 70 mg/dL      Glucose 96 mg/dL      Calcium 9 4 mg/dL      AST 13 U/L      ALT 10 U/L      Alkaline Phosphatase 54 U/L      Total Protein 7 0 g/dL      Albumin 4 3 g/dL      Total Bilirubin 1 02 mg/dL      eGFR 97 ml/min/1 73sq m     Narrative:      National Kidney Disease Foundation guidelines for Chronic Kidney Disease (CKD):     Stage 1 with normal or high GFR (GFR > 90 mL/min/1 73 square meters)    Stage 2 Mild CKD (GFR = 60-89 mL/min/1 73 square meters)    Stage 3A Moderate CKD (GFR = 45-59 mL/min/1 73 square meters)    Stage 3B Moderate CKD (GFR = 30-44 mL/min/1 73 square meters)    Stage 4 Severe CKD (GFR = 15-29 mL/min/1 73 square meters)    Stage 5 End Stage CKD (GFR <15 mL/min/1 73 square meters)  Note: GFR calculation is accurate only with a steady state creatinine    Urine Microscopic [[id number]]  (Abnormal) Collected: 07/09/22 0000    Lab Status: Final result Specimen: Urine, Other Updated: 07/09/22 0020     RBC, UA 1-2 /hpf      WBC, UA 4-10 /hpf      Epithelial Cells Moderate /hpf      Bacteria, UA Moderate /hpf      MUCUS THREADS Moderate    CBC and differential [[id number]] Collected: 07/08/22 2357    Lab Status: Final result Specimen: Blood from Arm, Right Updated: 07/09/22 0016     WBC 7 91 Thousand/uL      RBC 4 60 Million/uL      Hemoglobin 14 6 g/dL      Hematocrit 42 9 %      MCV 93 fL      MCH 31 7 pg      MCHC 34 0 g/dL      RDW 12 3 %      MPV 10 1 fL      Platelets 489 Thousands/uL      nRBC 0 /100 WBCs      Neutrophils Relative 65 %      Immat GRANS % 0 %      Lymphocytes Relative 25 %      Monocytes Relative 8 %      Eosinophils Relative 2 %      Basophils Relative 0 %      Neutrophils Absolute 5 08 Thousands/µL Immature Grans Absolute 0 02 Thousand/uL      Lymphocytes Absolute 2 00 Thousands/µL      Monocytes Absolute 0 60 Thousand/µL      Eosinophils Absolute 0 18 Thousand/µL      Basophils Absolute 0 03 Thousands/µL     UA w Reflex to Microscopic w Reflex to Culture [[id number]]  (Abnormal) Collected: 07/09/22 0000    Lab Status: Final result Specimen: Urine, Other Updated: 07/09/22 0007     Color, UA Yellow     Clarity, UA Clear     Specific Gravity, UA >=1 030     pH, UA 6 0     Leukocytes, UA Trace     Nitrite, UA Negative     Protein, UA Negative mg/dl      Glucose, UA Negative mg/dl      Ketones, UA Negative mg/dl      Urobilinogen, UA 0 2 E U /dl      Bilirubin, UA Negative     Occult Blood, UA Negative    POCT pregnancy, urine [[id number]]  (Normal) Resulted: 07/08/22 2347    Lab Status: Final result Updated: 07/08/22 2347     EXT PREG TEST UR (Ref: Negative) Negative     Control valid                 No orders to display              Procedures  Procedures         ED Course                                             MDM  Number of Diagnoses or Management Options  Elevated blood pressure reading  Nausea  UTI (urinary tract infection)  Diagnosis management comments: Patient is a 56year old female who presents to the ED with nausea and intermittent brown non-bloody diarrhea x 2 days with notes dark colored urine and intermittent dysuria  Exam wnl  Evaluation not consistent with pyelonephritis, appendicitis, gastritis, pancreatitis, colitis, gastroenteritis, urolithiasis, cystitis, diverticulitis, ovarian cyst/torsion, c  diff  Pregnancy negative  Renal and hepatic fx wnl  UA with UTI  Glucose wnl with no glucosuria  Asymptomatic HTN noted, did not take prescribed lisinopril today, given in ED  Patient instructed to take lisinopril daily, discussed risks of uncontrolled HTN including CVA and ACS   Denies CP, no neuro focal deficits on exam      Patient well appearing, VSS, stable for discharge      -zofran PRN  -macorbid x 5 days  -f/u with PCP  -strict ED return precautions discussed     Results discussed with patient, who verbalized understanding and agreement with the management plan  Strict ED return instructions were discussed at bedside and all questions were answered  Prior to discharge, I provided both verbal and written instructions of the management plan and the signs and symptoms that should prompt the patient to return to the ED  All questions were answered and the patient was comfortable with the plan of care and discharged home  The patient agrees to return to the Emergency Department for concerns and/or progression of illness  Amount and/or Complexity of Data Reviewed  Clinical lab tests: ordered and reviewed    Patient Progress  Patient progress: improved      Disposition  Final diagnoses:   Nausea   UTI (urinary tract infection)   Elevated blood pressure reading     Time reflects when diagnosis was documented in both MDM as applicable and the Disposition within this note     Time User Action Codes Description Comment    7/9/2022 12:42 AM Eleanora Ricks Add [R11 0] Nausea     7/9/2022 12:42 AM Eleanora Ricks Add [N39 0] UTI (urinary tract infection)     7/9/2022 12:55 AM Eleanora Ricks Add [R03 0] Elevated blood pressure reading       ED Disposition     ED Disposition   Discharge    Condition   Stable    Date/Time   Sat Jul 9, 2022 12:55 AM    Comment   Daniela Harrell discharge to home/self care                 Follow-up Information     Follow up With Specialties Details Why Contact Info Additional Information    Carlos Jensen MD Family Medicine Schedule an appointment as soon as possible for a visit in 3 days  [address] [facility]  [address] [facility] Emergency Department Emergency Medicine  If symptoms worsen [address] [facility],[address] [id number]  [address] [place] [address] Emergency Department, [address] [place] [address] [address], [address]          Discharge Medication List as of 7/9/2022 12:55 AM      START taking these medications    Details   nitrofurantoin (MACROBID) 100 mg capsule Take 1 capsule (100 mg total) by mouth 2 (two) times a day for 5 days, Starting Sat 7/9/2022, Until Thu 7/14/2022, Normal         CONTINUE these medications which have CHANGED    Details   ondansetron (Zofran ODT) 4 mg disintegrating tablet Take 1 tablet (4 mg total) by mouth every 6 (six) hours as needed for nausea or vomiting, Starting Sat 7/9/2022, Normal         CONTINUE these medications which have NOT CHANGED    Details   lisinopril (ZESTRIL) 2 5 mg tablet Take 2 5 mg by mouth daily, Historical Med             No discharge procedures on file      PDMP Review     None          ED Provider  Electronically Signed by           Jacey Tobar PA-C  07/09/22 7678

## 2022-07-14 ENCOUNTER — HOSPITAL ENCOUNTER (INPATIENT)
Facility: HOSPITAL | Age: 57
LOS: 3 days | Discharge: HOME | DRG: 133 | End: 2022-07-17
Attending: EMERGENCY MEDICINE | Admitting: STUDENT IN AN ORGANIZED HEALTH CARE EDUCATION/TRAINING PROGRAM
Payer: MEDICARE

## 2022-07-14 ENCOUNTER — APPOINTMENT (EMERGENCY)
Dept: CT IMAGING | Facility: HOSPITAL | Age: 57
End: 2022-07-14
Payer: MEDICARE

## 2022-07-14 ENCOUNTER — HOSPITAL ENCOUNTER (EMERGENCY)
Facility: HOSPITAL | Age: 57
Discharge: HOME | End: 2022-07-14
Attending: EMERGENCY MEDICINE | Admitting: EMERGENCY MEDICINE
Payer: MEDICARE

## 2022-07-14 ENCOUNTER — APPOINTMENT (EMERGENCY)
Dept: RADIOLOGY | Facility: HOSPITAL | Age: 57
DRG: 133 | End: 2022-07-14
Payer: MEDICARE

## 2022-07-14 ENCOUNTER — APPOINTMENT (EMERGENCY)
Dept: RADIOLOGY | Facility: HOSPITAL | Age: 57
End: 2022-07-14
Payer: MEDICARE

## 2022-07-14 VITALS
RESPIRATION RATE: 16 BRPM | SYSTOLIC BLOOD PRESSURE: 169 MMHG | DIASTOLIC BLOOD PRESSURE: 94 MMHG | TEMPERATURE: 98.3 F | HEART RATE: 65 BPM | OXYGEN SATURATION: 93 %

## 2022-07-14 DIAGNOSIS — R06.02 SOB (SHORTNESS OF BREATH): Primary | ICD-10-CM

## 2022-07-14 DIAGNOSIS — R09.02 HYPOXIA: ICD-10-CM

## 2022-07-14 DIAGNOSIS — J44.1 COPD EXACERBATION (HCC): ICD-10-CM

## 2022-07-14 DIAGNOSIS — J40 BRONCHITIS: ICD-10-CM

## 2022-07-14 DIAGNOSIS — R07.9 CHEST PAIN: ICD-10-CM

## 2022-07-14 DIAGNOSIS — I10 PRIMARY HYPERTENSION: ICD-10-CM

## 2022-07-14 DIAGNOSIS — N39.0 UTI (URINARY TRACT INFECTION): Primary | ICD-10-CM

## 2022-07-14 PROBLEM — N30.00 ACUTE CYSTITIS: Status: ACTIVE | Noted: 2022-07-14

## 2022-07-14 PROBLEM — J96.01 ACUTE RESPIRATORY FAILURE WITH HYPOXIA (HCC): Status: ACTIVE | Noted: 2022-07-14

## 2022-07-14 PROBLEM — R65.10 SIRS (SYSTEMIC INFLAMMATORY RESPONSE SYNDROME) (HCC): Status: ACTIVE | Noted: 2022-07-14

## 2022-07-14 PROBLEM — Z72.0 TOBACCO USE: Status: ACTIVE | Noted: 2022-07-14

## 2022-07-14 PROBLEM — J20.9 ACUTE BRONCHITIS: Status: ACTIVE | Noted: 2022-07-14

## 2022-07-14 LAB
2HR DELTA HS TROPONIN: 0 NG/L
4HR DELTA HS TROPONIN: -1 NG/L
ALBUMIN SERPL BCP-MCNC: 4.4 G/DL (ref 3.5–5)
ALBUMIN SERPL BCP-MCNC: 4.5 G/DL (ref 3.5–5)
ALP SERPL-CCNC: 48 U/L (ref 34–104)
ALP SERPL-CCNC: 55 U/L (ref 34–104)
ALT SERPL W P-5'-P-CCNC: 12 U/L (ref 7–52)
ALT SERPL W P-5'-P-CCNC: 8 U/L (ref 7–52)
ANION GAP SERPL CALCULATED.3IONS-SCNC: 11 MMOL/L (ref 4–13)
ANION GAP SERPL CALCULATED.3IONS-SCNC: 9 MMOL/L (ref 4–13)
AST SERPL W P-5'-P-CCNC: 10 U/L (ref 13–39)
AST SERPL W P-5'-P-CCNC: 12 U/L (ref 13–39)
BACTERIA UR QL AUTO: ABNORMAL /HPF
BASE EXCESS BLDA CALC-SCNC: -2.4 MMOL/L
BASOPHILS # BLD AUTO: 0 THOUSANDS/ΜL (ref 0–0.1)
BASOPHILS # BLD AUTO: 0.03 THOUSANDS/ΜL (ref 0–0.1)
BASOPHILS NFR BLD AUTO: 0 % (ref 0–1)
BASOPHILS NFR BLD AUTO: 0 % (ref 0–1)
BILIRUB SERPL-MCNC: 0.73 MG/DL (ref 0.2–1)
BILIRUB SERPL-MCNC: 0.94 MG/DL (ref 0.2–1)
BILIRUB UR QL STRIP: NEGATIVE
BNP SERPL-MCNC: 90 PG/ML (ref 0–100)
BUN SERPL-MCNC: 11 MG/DL (ref 5–25)
BUN SERPL-MCNC: 8 MG/DL (ref 5–25)
CALCIUM SERPL-MCNC: 9.4 MG/DL (ref 8.4–10.2)
CALCIUM SERPL-MCNC: 9.7 MG/DL (ref 8.4–10.2)
CARDIAC TROPONIN I PNL SERPL HS: 3 NG/L
CARDIAC TROPONIN I PNL SERPL HS: 3 NG/L
CARDIAC TROPONIN I PNL SERPL HS: 4 NG/L
CARDIAC TROPONIN I PNL SERPL HS: 4 NG/L
CHLORIDE SERPL-SCNC: 104 MMOL/L (ref 96–108)
CHLORIDE SERPL-SCNC: 106 MMOL/L (ref 96–108)
CLARITY UR: CLEAR
CO2 SERPL-SCNC: 23 MMOL/L (ref 21–32)
CO2 SERPL-SCNC: 27 MMOL/L (ref 21–32)
COLOR UR: YELLOW
CREAT SERPL-MCNC: 0.7 MG/DL (ref 0.6–1.3)
CREAT SERPL-MCNC: 0.78 MG/DL (ref 0.6–1.3)
D DIMER PPP FEU-MCNC: 0.45 UG/ML FEU
EOSINOPHIL # BLD AUTO: 0 THOUSAND/ΜL (ref 0–0.61)
EOSINOPHIL # BLD AUTO: 0.14 THOUSAND/ΜL (ref 0–0.61)
EOSINOPHIL NFR BLD AUTO: 0 % (ref 0–6)
EOSINOPHIL NFR BLD AUTO: 2 % (ref 0–6)
ERYTHROCYTE [DISTWIDTH] IN BLOOD BY AUTOMATED COUNT: 12.5 % (ref 11.6–15.1)
ERYTHROCYTE [DISTWIDTH] IN BLOOD BY AUTOMATED COUNT: 12.7 % (ref 11.6–15.1)
GFR SERPL CREATININE-BSD FRML MDRD: 85 ML/MIN/1.73SQ M
GFR SERPL CREATININE-BSD FRML MDRD: 97 ML/MIN/1.73SQ M
GLUCOSE SERPL-MCNC: 117 MG/DL (ref 65–140)
GLUCOSE SERPL-MCNC: 165 MG/DL (ref 65–140)
GLUCOSE SERPL-MCNC: 170 MG/DL (ref 65–140)
GLUCOSE UR STRIP-MCNC: NEGATIVE MG/DL
HCO3 BLDA-SCNC: 20.6 MMOL/L (ref 22–28)
HCT VFR BLD AUTO: 42 % (ref 34.8–46.1)
HCT VFR BLD AUTO: 42.4 % (ref 34.8–46.1)
HGB BLD-MCNC: 14.3 G/DL (ref 11.5–15.4)
HGB BLD-MCNC: 14.5 G/DL (ref 11.5–15.4)
HGB UR QL STRIP.AUTO: NEGATIVE
IMM GRANULOCYTES # BLD AUTO: 0.01 THOUSAND/UL (ref 0–0.2)
IMM GRANULOCYTES # BLD AUTO: 0.02 THOUSAND/UL (ref 0–0.2)
IMM GRANULOCYTES NFR BLD AUTO: 0 % (ref 0–2)
IMM GRANULOCYTES NFR BLD AUTO: 0 % (ref 0–2)
KETONES UR STRIP-MCNC: NEGATIVE MG/DL
LEUKOCYTE ESTERASE UR QL STRIP: ABNORMAL
LYMPHOCYTES # BLD AUTO: 0.35 THOUSANDS/ΜL (ref 0.6–4.47)
LYMPHOCYTES # BLD AUTO: 1.69 THOUSANDS/ΜL (ref 0.6–4.47)
LYMPHOCYTES NFR BLD AUTO: 25 % (ref 14–44)
LYMPHOCYTES NFR BLD AUTO: 6 % (ref 14–44)
MAGNESIUM SERPL-MCNC: 2 MG/DL (ref 1.9–2.7)
MCH RBC QN AUTO: 31.6 PG (ref 26.8–34.3)
MCH RBC QN AUTO: 31.9 PG (ref 26.8–34.3)
MCHC RBC AUTO-ENTMCNC: 34 G/DL (ref 31.4–37.4)
MCHC RBC AUTO-ENTMCNC: 34.2 G/DL (ref 31.4–37.4)
MCV RBC AUTO: 93 FL (ref 82–98)
MCV RBC AUTO: 93 FL (ref 82–98)
MONOCYTES # BLD AUTO: 0.11 THOUSAND/ΜL (ref 0.17–1.22)
MONOCYTES # BLD AUTO: 0.54 THOUSAND/ΜL (ref 0.17–1.22)
MONOCYTES NFR BLD AUTO: 2 % (ref 4–12)
MONOCYTES NFR BLD AUTO: 8 % (ref 4–12)
MUCOUS THREADS UR QL AUTO: ABNORMAL
NEUTROPHILS # BLD AUTO: 4.28 THOUSANDS/ΜL (ref 1.85–7.62)
NEUTROPHILS # BLD AUTO: 5.72 THOUSANDS/ΜL (ref 1.85–7.62)
NEUTS SEG NFR BLD AUTO: 65 % (ref 43–75)
NEUTS SEG NFR BLD AUTO: 92 % (ref 43–75)
NITRITE UR QL STRIP: NEGATIVE
NON-SQ EPI CELLS URNS QL MICRO: ABNORMAL /HPF
NRBC BLD AUTO-RTO: 0 /100 WBCS
NRBC BLD AUTO-RTO: 0 /100 WBCS
O2 CT BLDA-SCNC: 17.7 ML/DL (ref 16–23)
OXYHGB MFR BLDA: 85.6 % (ref 94–97)
PCO2 BLDA: 30.9 MM HG (ref 36–44)
PH BLDA: 7.44 [PH] (ref 7.35–7.45)
PH UR STRIP.AUTO: 6.5 [PH]
PLATELET # BLD AUTO: 225 THOUSANDS/UL (ref 149–390)
PLATELET # BLD AUTO: 238 THOUSANDS/UL (ref 149–390)
PMV BLD AUTO: 10.2 FL (ref 8.9–12.7)
PMV BLD AUTO: 10.5 FL (ref 8.9–12.7)
PO2 BLDA: 51.8 MM HG (ref 75–129)
POTASSIUM SERPL-SCNC: 3.2 MMOL/L (ref 3.5–5.3)
POTASSIUM SERPL-SCNC: 3.8 MMOL/L (ref 3.5–5.3)
PROCALCITONIN SERPL-MCNC: <0.05 NG/ML
PROT SERPL-MCNC: 7.1 G/DL (ref 6.4–8.4)
PROT SERPL-MCNC: 7.3 G/DL (ref 6.4–8.4)
PROT UR STRIP-MCNC: NEGATIVE MG/DL
RBC # BLD AUTO: 4.52 MILLION/UL (ref 3.81–5.12)
RBC # BLD AUTO: 4.54 MILLION/UL (ref 3.81–5.12)
RBC #/AREA URNS AUTO: ABNORMAL /HPF
SARS-COV-2 RNA RESP QL NAA+PROBE: NEGATIVE
SODIUM SERPL-SCNC: 140 MMOL/L (ref 135–147)
SODIUM SERPL-SCNC: 140 MMOL/L (ref 135–147)
SP GR UR STRIP.AUTO: 1.02 (ref 1–1.03)
UROBILINOGEN UR QL STRIP.AUTO: 0.2 E.U./DL
WBC # BLD AUTO: 6.2 THOUSAND/UL (ref 4.31–10.16)
WBC # BLD AUTO: 6.69 THOUSAND/UL (ref 4.31–10.16)
WBC #/AREA URNS AUTO: ABNORMAL /HPF

## 2022-07-14 PROCEDURE — 99285 EMERGENCY DEPT VISIT HI MDM: CPT

## 2022-07-14 PROCEDURE — 87635 SARS-COV-2 COVID-19 AMP PRB: CPT | Performed by: STUDENT IN AN ORGANIZED HEALTH CARE EDUCATION/TRAINING PROGRAM

## 2022-07-14 PROCEDURE — 99285 EMERGENCY DEPT VISIT HI MDM: CPT | Performed by: EMERGENCY MEDICINE

## 2022-07-14 PROCEDURE — 81001 URINALYSIS AUTO W/SCOPE: CPT | Performed by: EMERGENCY MEDICINE

## 2022-07-14 PROCEDURE — 82948 REAGENT STRIP/BLOOD GLUCOSE: CPT

## 2022-07-14 PROCEDURE — 84484 ASSAY OF TROPONIN QUANT: CPT | Performed by: EMERGENCY MEDICINE

## 2022-07-14 PROCEDURE — 99285 EMERGENCY DEPT VISIT HI MDM: CPT | Performed by: STUDENT IN AN ORGANIZED HEALTH CARE EDUCATION/TRAINING PROGRAM

## 2022-07-14 PROCEDURE — 80053 COMPREHEN METABOLIC PANEL: CPT | Performed by: EMERGENCY MEDICINE

## 2022-07-14 PROCEDURE — 83880 ASSAY OF NATRIURETIC PEPTIDE: CPT | Performed by: STUDENT IN AN ORGANIZED HEALTH CARE EDUCATION/TRAINING PROGRAM

## 2022-07-14 PROCEDURE — 94640 AIRWAY INHALATION TREATMENT: CPT

## 2022-07-14 PROCEDURE — 99223 1ST HOSP IP/OBS HIGH 75: CPT | Performed by: STUDENT IN AN ORGANIZED HEALTH CARE EDUCATION/TRAINING PROGRAM

## 2022-07-14 PROCEDURE — 71045 X-RAY EXAM CHEST 1 VIEW: CPT

## 2022-07-14 PROCEDURE — 81003 URINALYSIS AUTO W/O SCOPE: CPT | Performed by: EMERGENCY MEDICINE

## 2022-07-14 PROCEDURE — 82805 BLOOD GASES W/O2 SATURATION: CPT | Performed by: EMERGENCY MEDICINE

## 2022-07-14 PROCEDURE — G1004 CDSM NDSC: HCPCS

## 2022-07-14 PROCEDURE — 99284 EMERGENCY DEPT VISIT MOD MDM: CPT

## 2022-07-14 PROCEDURE — 93005 ELECTROCARDIOGRAM TRACING: CPT

## 2022-07-14 PROCEDURE — 84145 PROCALCITONIN (PCT): CPT

## 2022-07-14 PROCEDURE — 87086 URINE CULTURE/COLONY COUNT: CPT | Performed by: EMERGENCY MEDICINE

## 2022-07-14 PROCEDURE — 85025 COMPLETE CBC W/AUTO DIFF WBC: CPT | Performed by: EMERGENCY MEDICINE

## 2022-07-14 PROCEDURE — 74177 CT ABD & PELVIS W/CONTRAST: CPT

## 2022-07-14 PROCEDURE — 71275 CT ANGIOGRAPHY CHEST: CPT

## 2022-07-14 PROCEDURE — 94664 DEMO&/EVAL PT USE INHALER: CPT

## 2022-07-14 PROCEDURE — 85379 FIBRIN DEGRADATION QUANT: CPT | Performed by: EMERGENCY MEDICINE

## 2022-07-14 PROCEDURE — 83735 ASSAY OF MAGNESIUM: CPT | Performed by: STUDENT IN AN ORGANIZED HEALTH CARE EDUCATION/TRAINING PROGRAM

## 2022-07-14 PROCEDURE — 84484 ASSAY OF TROPONIN QUANT: CPT | Performed by: STUDENT IN AN ORGANIZED HEALTH CARE EDUCATION/TRAINING PROGRAM

## 2022-07-14 PROCEDURE — 36415 COLL VENOUS BLD VENIPUNCTURE: CPT | Performed by: EMERGENCY MEDICINE

## 2022-07-14 PROCEDURE — 96374 THER/PROPH/DIAG INJ IV PUSH: CPT

## 2022-07-14 PROCEDURE — 80053 COMPREHEN METABOLIC PANEL: CPT | Performed by: STUDENT IN AN ORGANIZED HEALTH CARE EDUCATION/TRAINING PROGRAM

## 2022-07-14 PROCEDURE — 85025 COMPLETE CBC W/AUTO DIFF WBC: CPT | Performed by: STUDENT IN AN ORGANIZED HEALTH CARE EDUCATION/TRAINING PROGRAM

## 2022-07-14 RX ORDER — ENOXAPARIN SODIUM 100 MG/ML
40 INJECTION SUBCUTANEOUS DAILY
Status: DISCONTINUED | OUTPATIENT
Start: 2022-07-15 | End: 2022-07-17 | Stop reason: HOSPADM

## 2022-07-14 RX ORDER — IPRATROPIUM BROMIDE AND ALBUTEROL SULFATE 2.5; .5 MG/3ML; MG/3ML
3 SOLUTION RESPIRATORY (INHALATION)
Status: DISCONTINUED | OUTPATIENT
Start: 2022-07-14 | End: 2022-07-14 | Stop reason: HOSPADM

## 2022-07-14 RX ORDER — CLONIDINE HYDROCHLORIDE 0.1 MG/1
0.2 TABLET ORAL ONCE
Status: COMPLETED | OUTPATIENT
Start: 2022-07-14 | End: 2022-07-14

## 2022-07-14 RX ORDER — LISINOPRIL 5 MG/1
5 TABLET ORAL DAILY
Status: DISCONTINUED | OUTPATIENT
Start: 2022-07-15 | End: 2022-07-17 | Stop reason: HOSPADM

## 2022-07-14 RX ORDER — GUAIFENESIN 600 MG/1
600 TABLET, EXTENDED RELEASE ORAL 2 TIMES DAILY
Status: DISCONTINUED | OUTPATIENT
Start: 2022-07-15 | End: 2022-07-15

## 2022-07-14 RX ORDER — LISINOPRIL 2.5 MG/1
2.5 TABLET ORAL DAILY
Status: DISCONTINUED | OUTPATIENT
Start: 2022-07-15 | End: 2022-07-14

## 2022-07-14 RX ORDER — CEFUROXIME AXETIL 500 MG/1
500 TABLET ORAL EVERY 12 HOURS SCHEDULED
Qty: 14 TABLET | Refills: 0 | Status: SHIPPED | OUTPATIENT
Start: 2022-07-14 | End: 2022-07-21

## 2022-07-14 RX ORDER — LISINOPRIL 5 MG/1
2.5 TABLET ORAL ONCE
Status: COMPLETED | OUTPATIENT
Start: 2022-07-14 | End: 2022-07-14

## 2022-07-14 RX ORDER — NICOTINE 21 MG/24HR
1 PATCH, TRANSDERMAL 24 HOURS TRANSDERMAL DAILY
Status: DISCONTINUED | OUTPATIENT
Start: 2022-07-15 | End: 2022-07-17 | Stop reason: HOSPADM

## 2022-07-14 RX ORDER — LEVALBUTEROL INHALATION SOLUTION 1.25 MG/3ML
1.25 SOLUTION RESPIRATORY (INHALATION)
Status: DISCONTINUED | OUTPATIENT
Start: 2022-07-14 | End: 2022-07-17 | Stop reason: HOSPADM

## 2022-07-14 RX ORDER — AZITHROMYCIN 500 MG/1
500 TABLET, FILM COATED ORAL EVERY 24 HOURS
Status: COMPLETED | OUTPATIENT
Start: 2022-07-14 | End: 2022-07-16

## 2022-07-14 RX ORDER — ASPIRIN 81 MG/1
324 TABLET, CHEWABLE ORAL ONCE
Status: COMPLETED | OUTPATIENT
Start: 2022-07-14 | End: 2022-07-14

## 2022-07-14 RX ORDER — ALBUTEROL SULFATE 90 UG/1
2 AEROSOL, METERED RESPIRATORY (INHALATION) ONCE
Status: COMPLETED | OUTPATIENT
Start: 2022-07-14 | End: 2022-07-14

## 2022-07-14 RX ORDER — IPRATROPIUM BROMIDE AND ALBUTEROL SULFATE 2.5; .5 MG/3ML; MG/3ML
3 SOLUTION RESPIRATORY (INHALATION)
Status: DISCONTINUED | OUTPATIENT
Start: 2022-07-14 | End: 2022-07-14

## 2022-07-14 RX ORDER — METHYLPREDNISOLONE SODIUM SUCCINATE 40 MG/ML
40 INJECTION, POWDER, LYOPHILIZED, FOR SOLUTION INTRAMUSCULAR; INTRAVENOUS EVERY 8 HOURS
Status: COMPLETED | OUTPATIENT
Start: 2022-07-14 | End: 2022-07-15

## 2022-07-14 RX ORDER — POTASSIUM CHLORIDE 20 MEQ/1
40 TABLET, EXTENDED RELEASE ORAL ONCE
Status: COMPLETED | OUTPATIENT
Start: 2022-07-14 | End: 2022-07-14

## 2022-07-14 RX ORDER — METHYLPREDNISOLONE SODIUM SUCCINATE 125 MG/2ML
80 INJECTION, POWDER, LYOPHILIZED, FOR SOLUTION INTRAMUSCULAR; INTRAVENOUS ONCE
Status: COMPLETED | OUTPATIENT
Start: 2022-07-14 | End: 2022-07-14

## 2022-07-14 RX ORDER — NITROFURANTOIN 25; 75 MG/1; MG/1
100 CAPSULE ORAL 2 TIMES DAILY
Status: DISCONTINUED | OUTPATIENT
Start: 2022-07-15 | End: 2022-07-14

## 2022-07-14 RX ORDER — SODIUM CHLORIDE FOR INHALATION 0.9 %
3 VIAL, NEBULIZER (ML) INHALATION
Status: DISCONTINUED | OUTPATIENT
Start: 2022-07-14 | End: 2022-07-14

## 2022-07-14 RX ORDER — METHYLPREDNISOLONE 4 MG/1
TABLET ORAL
Qty: 21 TABLET | Refills: 0 | Status: SHIPPED | OUTPATIENT
Start: 2022-07-14 | End: 2022-07-17

## 2022-07-14 RX ORDER — NITROGLYCERIN 0.4 MG/1
0.4 TABLET SUBLINGUAL
Status: DISCONTINUED | OUTPATIENT
Start: 2022-07-14 | End: 2022-07-14 | Stop reason: HOSPADM

## 2022-07-14 RX ORDER — ACETAMINOPHEN 325 MG/1
650 TABLET ORAL EVERY 6 HOURS PRN
Status: DISCONTINUED | OUTPATIENT
Start: 2022-07-14 | End: 2022-07-17 | Stop reason: HOSPADM

## 2022-07-14 RX ADMIN — IPRATROPIUM BROMIDE AND ALBUTEROL SULFATE 3 ML: .5; 3 SOLUTION RESPIRATORY (INHALATION) at 20:41

## 2022-07-14 RX ADMIN — METHYLPREDNISOLONE SODIUM SUCCINATE 80 MG: 125 INJECTION, POWDER, FOR SOLUTION INTRAMUSCULAR; INTRAVENOUS at 09:25

## 2022-07-14 RX ADMIN — ISODIUM CHLORIDE 3 ML: 0.03 SOLUTION RESPIRATORY (INHALATION) at 23:29

## 2022-07-14 RX ADMIN — METHYLPREDNISOLONE SODIUM SUCCINATE 40 MG: 40 INJECTION, POWDER, FOR SOLUTION INTRAMUSCULAR; INTRAVENOUS at 23:31

## 2022-07-14 RX ADMIN — PREDNISONE 50 MG: 20 TABLET ORAL at 21:45

## 2022-07-14 RX ADMIN — CLONIDINE HYDROCHLORIDE 0.2 MG: 0.1 TABLET ORAL at 08:53

## 2022-07-14 RX ADMIN — LISINOPRIL 2.5 MG: 5 TABLET ORAL at 06:55

## 2022-07-14 RX ADMIN — LEVALBUTEROL HYDROCHLORIDE 1.25 MG: 1.25 SOLUTION RESPIRATORY (INHALATION) at 23:29

## 2022-07-14 RX ADMIN — IPRATROPIUM BROMIDE AND ALBUTEROL SULFATE 3 ML: 2.5; .5 SOLUTION RESPIRATORY (INHALATION) at 20:39

## 2022-07-14 RX ADMIN — ALBUTEROL SULFATE 2 PUFF: 90 AEROSOL, METERED RESPIRATORY (INHALATION) at 09:49

## 2022-07-14 RX ADMIN — IPRATROPIUM BROMIDE AND ALBUTEROL SULFATE 3 ML: 2.5; .5 SOLUTION RESPIRATORY (INHALATION) at 07:34

## 2022-07-14 RX ADMIN — POTASSIUM CHLORIDE 40 MEQ: 1500 TABLET, EXTENDED RELEASE ORAL at 06:56

## 2022-07-14 RX ADMIN — IPRATROPIUM BROMIDE 0.5 MG: 0.5 SOLUTION RESPIRATORY (INHALATION) at 23:29

## 2022-07-14 RX ADMIN — IOHEXOL 70 ML: 350 INJECTION, SOLUTION INTRAVENOUS at 07:41

## 2022-07-14 RX ADMIN — AZITHROMYCIN 500 MG: 500 TABLET, FILM COATED ORAL at 23:31

## 2022-07-14 RX ADMIN — ASPIRIN 324 MG: 81 TABLET, CHEWABLE ORAL at 03:31

## 2022-07-14 NOTE — ED CARE HANDOFF
Emergency Department Sign Out Note        Sign out and transfer of care from   See Separate Emergency Department note  The patient, Anaya Fournier, was evaluated by the previous provider for   Workup Completed: To er with 2 compliants  First, she is being tx for a uti and states for the last few days she is on abx and is not improved, continues with dysuria  No flank pain, fever, chills  Second she co cp  Cp is right sided, sharp and seems to come and go  Pain is reported as sharp stabs that last several seconds  No leg swelling, reported sob, exertional aspects, orthopnea is reported  No dvt/pe hx  In er she was noted to be hypoxic in the uppers 80s and placed on 2L O2  She is with mild dry cough, is coughing when I am in room  She is a smoker  On exam mild decreased air entry bl nodistress, appears comfortable  Her abd is soft with mild suprapubic tenderness  No cva tenderness  No le edema, does not appears fluid overloaded on exam  Rrr  Will plan to fu PE study  Will reeval after this study and will take off 02 to see basesline  Will trial duoneb  Will plan to change abx, use cefuroxime for tx failure  No cx on last ua, will cx today  She is feeling better sp duoneb  She was diminished on exam  Will start medrol dose pack for presumed copd /broncihtis with wheezing  Will give alb inhaler as she reported she felt better with neb tx  She does not appear fluid over loaded on exam  She reported her cp as sharp and electric like for only several seconds  Will plan to refer to [facility] cardio for possible out opt stress testing in the face of her risk factors for heart disease  I have offered her an obs stay for further eval, she declined, states she wishes to leave and she is upset she has been in er this long  I have discussed limitations of cp eval in er, she is aware no MI today but still she can be at higher risk for mi and does need out pt fu, she has voiced understanding    I have discussed the heart score with pt and that hers is high and she again declined admission, requesting to leave  As per her request she was dc home  I have discussed all red flags, need for fu and when to return to er and she has voiced understanding  ED Course / Workup Pending (followup):                                       Procedures  MDM        Disposition  Final diagnoses:   None     ED Disposition     None      Follow-up Information    None       Patient's Medications   Discharge Prescriptions    No medications on file     No discharge procedures on file         ED Provider  Electronically Signed by     Mati Rothman MD  07/14/22 9035

## 2022-07-14 NOTE — ED NOTES
Pt found to be 88% on RA while awake and HOB elevated  2L NC placed on pt  Pt 92-93% on 2L NC, provider and primary nurse notified         Gabriel Norris RN  07/14/22 3480

## 2022-07-14 NOTE — ED PROVIDER NOTES
History  Chief Complaint   Patient presents with    Possible UTI     Pt states she was dx with uti 7/8 and started her antibiotics  She states she has been having a pain in her chest for 2 days  Patient presents to the emergency department a chief concern right-sided chest pain  Patient states she has had the pain for 3 days off and on but is persisting  She describes it as a dull achy pain it is more worse with movement and certain positions  At time of evaluation pain is rated a 6/10, constant, nonradiating  She reports some mild shortness of breath but states that she does smoke half a pack of cigarettes a day and is always short of breath  She denies any other associated systemic symptoms  History provided by:  Patient   used: No        Prior to Admission Medications   Prescriptions Last Dose Informant Patient Reported? Taking?   lisinopril (ZESTRIL) 2 5 mg tablet  Self Yes No   Sig: Take 2 5 mg by mouth daily   nitrofurantoin (MACROBID) 100 mg capsule   No No   Sig: Take 1 capsule (100 mg total) by mouth 2 (two) times a day for 5 days   ondansetron (Zofran ODT) 4 mg disintegrating tablet   No No   Sig: Take 1 tablet (4 mg total) by mouth every 6 (six) hours as needed for nausea or vomiting      Facility-Administered Medications: None       Past Medical History:   Diagnosis Date    Hypertension     Psychiatric disorder        Past Surgical History:   Procedure Laterality Date    ABDOMINAL SURGERY      gallbladder removal       History reviewed  No pertinent family history  I have reviewed and agree with the history as documented      E-Cigarette/Vaping    E-Cigarette Use Never User      E-Cigarette/Vaping Substances     Social History     Tobacco Use    Smoking status: Current Every Day Smoker     Packs/day: 0 50     Types: Cigarettes    Smokeless tobacco: Never Used   Vaping Use    Vaping Use: Never used   Substance Use Topics    Alcohol use: Not Currently    Drug use: Yes     Types: Marijuana     Comment: last used 07/07/22       Review of Systems   Constitutional: Negative for fever  Respiratory: Positive for cough and shortness of breath  Cardiovascular: Positive for chest pain  Negative for leg swelling  Gastrointestinal: Negative for abdominal pain and blood in stool  Genitourinary: Negative for dysuria and flank pain  Musculoskeletal: Negative for back pain  Skin: Negative for color change  Neurological: Negative for light-headedness  All other systems reviewed and are negative  Physical Exam  Physical Exam  Vitals and nursing note reviewed  Constitutional:       General: She is not in acute distress  Appearance: She is well-developed  HENT:      Head: Normocephalic and atraumatic  Eyes:      Conjunctiva/sclera: Conjunctivae normal    Cardiovascular:      Rate and Rhythm: Normal rate and regular rhythm  Heart sounds: No murmur heard  Pulmonary:      Effort: Pulmonary effort is normal  No respiratory distress  Breath sounds: Normal breath sounds  Abdominal:      Palpations: Abdomen is soft  Tenderness: There is no abdominal tenderness  Musculoskeletal:      Cervical back: Neck supple  Skin:     General: Skin is warm and dry  Neurological:      General: No focal deficit present  Mental Status: She is alert and oriented to person, place, and time  Mental status is at baseline           Vital Signs  ED Triage Vitals   Temperature Pulse Respirations Blood Pressure SpO2   07/14/22 0225 07/14/22 0225 07/14/22 0225 07/14/22 0225 07/14/22 0225   98 3 °F (36 8 °C) 89 16 (!) 173/112 98 %      Temp Source Heart Rate Source Patient Position - Orthostatic VS BP Location FiO2 (%)   07/14/22 0225 07/14/22 0333 07/14/22 0225 07/14/22 0225 --   Oral Monitor Lying Right arm       Pain Score       07/14/22 0225       7           Vitals:    07/14/22 0225 07/14/22 0333   BP: (!) 173/112 165/93   Pulse: 89 91   Patient Position - Orthostatic VS: Lying Lying         Visual Acuity      ED Medications  Medications   nitroglycerin (NITROSTAT) SL tablet 0 4 mg (has no administration in time range)   aspirin chewable tablet 324 mg (324 mg Oral Given 7/14/22 0331)       Diagnostic Studies  Results Reviewed     Procedure Component Value Units Date/Time    D-Dimer [[id number]] Collected: 07/14/22 0339    Lab Status:  In process Specimen: Blood from Arm, Left Updated: 07/14/22 0343    HS Troponin I 2hr [[id number]]     Lab Status: No result Specimen: Blood     HS Troponin 0hr (reflex protocol) [[id number]]  (Normal) Collected: 07/14/22 0251    Lab Status: Final result Specimen: Blood from Arm, Left Updated: 07/14/22 0324     hs TnI 0hr 4 ng/L     Comprehensive metabolic panel [[id number]]  (Abnormal) Collected: 07/14/22 0251    Lab Status: Final result Specimen: Blood from Arm, Left Updated: 07/14/22 0319     Sodium 140 mmol/L      Potassium 3 2 mmol/L      Chloride 104 mmol/L      CO2 27 mmol/L      ANION GAP 9 mmol/L      BUN 8 mg/dL      Creatinine 0 70 mg/dL      Glucose 117 mg/dL      Calcium 9 4 mg/dL      AST 10 U/L      ALT 8 U/L      Alkaline Phosphatase 48 U/L      Total Protein 7 1 g/dL      Albumin 4 4 g/dL      Total Bilirubin 0 94 mg/dL      eGFR 97 ml/min/1 73sq m     Narrative:      Blank guidelines for Chronic Kidney Disease (CKD):     Stage 1 with normal or high GFR (GFR > 90 mL/min/1 73 square meters)    Stage 2 Mild CKD (GFR = 60-89 mL/min/1 73 square meters)    Stage 3A Moderate CKD (GFR = 45-59 mL/min/1 73 square meters)    Stage 3B Moderate CKD (GFR = 30-44 mL/min/1 73 square meters)    Stage 4 Severe CKD (GFR = 15-29 mL/min/1 73 square meters)    Stage 5 End Stage CKD (GFR <15 mL/min/1 73 square meters)  Note: GFR calculation is accurate only with a steady state creatinine    Urine Microscopic [[id number]]  (Abnormal) Collected: 07/14/22 0254    Lab Status: Final result Specimen: Urine, Clean Catch Updated: 07/14/22 0315     RBC, UA None Seen /hpf      WBC, UA 0-5 /hpf      Epithelial Cells Occasional /hpf      Bacteria, UA Moderate /hpf      MUCUS THREADS Innumerable    UA w Reflex to Microscopic w Reflex to Culture [[id number]]  (Abnormal) Collected: 07/14/22 0254    Lab Status: Final result Specimen: Urine, Clean Catch Updated: 07/14/22 0302     Color, UA Yellow     Clarity, UA Clear     Specific Gravity, UA 1 025     pH, UA 6 5     Leukocytes, UA 2+     Nitrite, UA Negative     Protein, UA Negative mg/dl      Glucose, UA Negative mg/dl      Ketones, UA Negative mg/dl      Urobilinogen, UA 0 2 E U /dl      Bilirubin, UA Negative     Occult Blood, UA Negative    CBC and differential [[id number]] Collected: 07/14/22 0251    Lab Status: Final result Specimen: Blood from Arm, Left Updated: 07/14/22 0259     WBC 6 69 Thousand/uL      RBC 4 54 Million/uL      Hemoglobin 14 5 g/dL      Hematocrit 42 4 %      MCV 93 fL      MCH 31 9 pg      MCHC 34 2 g/dL      RDW 12 5 %      MPV 10 2 fL      Platelets 531 Thousands/uL      nRBC 0 /100 WBCs      Neutrophils Relative 65 %      Immat GRANS % 0 %      Lymphocytes Relative 25 %      Monocytes Relative 8 %      Eosinophils Relative 2 %      Basophils Relative 0 %      Neutrophils Absolute 4 28 Thousands/µL      Immature Grans Absolute 0 01 Thousand/uL      Lymphocytes Absolute 1 69 Thousands/µL      Monocytes Absolute 0 54 Thousand/µL      Eosinophils Absolute 0 14 Thousand/µL      Basophils Absolute 0 03 Thousands/µL                  No orders to display              Procedures  Procedures         ED Course                                             MDM  Number of Diagnoses or Management Options     Amount and/or Complexity of Data Reviewed  Clinical lab tests: ordered and reviewed  Tests in the radiology section of CPT®: ordered  Review and summarize past medical records: yes    Risk of Complications, Morbidity, and/or Mortality  Presenting problems: moderate  Diagnostic procedures: moderate  Management options: moderate  General comments: Labs and imaging reviewed with patient  She was found to be hypoxic on room air with a good plan of  She has a chronic tobacco user in but is not on home O2  Although her D-dimer was negative I am sending her for a CTA to rule out PE as well as aortic dissection considering her blood pressures have been markedly high  Patient states that she takes lisinopril in the morning and a dose of her medication is ordered  Patient has 2-troponins and no EKG changes to suggest an acute myocardial infarction  Case endorsed to oncoming ED physician at shift change pending imaging studies and re-evaluation  Disposition  Final diagnoses:   None     ED Disposition     None      Follow-up Information    None         Patient's Medications   Discharge Prescriptions    No medications on file       No discharge procedures on file      PDMP Review     None          ED Provider  Electronically Signed by           Angel Cheema MD  07/14/22 0731

## 2022-07-14 NOTE — DISCHARGE INSTRUCTIONS
Return to er with fever, chills, leg swelling, shortness of breath, return of chest pain  See pcp this week for follow up   Call cardiology for out pt follow up and possible stress test

## 2022-07-15 ENCOUNTER — APPOINTMENT (INPATIENT)
Dept: ULTRASOUND IMAGING | Facility: HOSPITAL | Age: 57
DRG: 133 | End: 2022-07-15
Payer: MEDICARE

## 2022-07-15 PROBLEM — L56.8: Status: ACTIVE | Noted: 2022-07-15

## 2022-07-15 PROBLEM — R93.89 ABNORMAL FINDING ON CT SCAN: Status: ACTIVE | Noted: 2022-07-15

## 2022-07-15 PROBLEM — R07.1 CHEST PAIN ON BREATHING: Status: ACTIVE | Noted: 2022-07-15

## 2022-07-15 LAB
ANION GAP SERPL CALCULATED.3IONS-SCNC: 10 MMOL/L (ref 4–13)
ATRIAL RATE: 89 BPM
BACTERIA UR CULT: NORMAL
BASE EX.OXY STD BLDV CALC-SCNC: 94.6 % (ref 60–80)
BASE EXCESS BLDV CALC-SCNC: -3.1 MMOL/L
BASOPHILS # BLD AUTO: 0 THOUSANDS/ΜL (ref 0–0.1)
BASOPHILS NFR BLD AUTO: 0 % (ref 0–1)
BUN SERPL-MCNC: 11 MG/DL (ref 5–25)
CALCIUM SERPL-MCNC: 9.3 MG/DL (ref 8.4–10.2)
CHLORIDE SERPL-SCNC: 107 MMOL/L (ref 96–108)
CO2 SERPL-SCNC: 23 MMOL/L (ref 21–32)
CREAT SERPL-MCNC: 0.76 MG/DL (ref 0.6–1.3)
EOSINOPHIL # BLD AUTO: 0 THOUSAND/ΜL (ref 0–0.61)
EOSINOPHIL NFR BLD AUTO: 0 % (ref 0–6)
ERYTHROCYTE [DISTWIDTH] IN BLOOD BY AUTOMATED COUNT: 12.9 % (ref 11.6–15.1)
GFR SERPL CREATININE-BSD FRML MDRD: 88 ML/MIN/1.73SQ M
GLUCOSE SERPL-MCNC: 180 MG/DL (ref 65–140)
HCO3 BLDV-SCNC: 22 MMOL/L (ref 24–30)
HCT VFR BLD AUTO: 41.4 % (ref 34.8–46.1)
HGB BLD-MCNC: 13.9 G/DL (ref 11.5–15.4)
IMM GRANULOCYTES # BLD AUTO: 0.03 THOUSAND/UL (ref 0–0.2)
IMM GRANULOCYTES NFR BLD AUTO: 0 % (ref 0–2)
LYMPHOCYTES # BLD AUTO: 0.4 THOUSANDS/ΜL (ref 0.6–4.47)
LYMPHOCYTES NFR BLD AUTO: 5 % (ref 14–44)
MCH RBC QN AUTO: 31.5 PG (ref 26.8–34.3)
MCHC RBC AUTO-ENTMCNC: 33.6 G/DL (ref 31.4–37.4)
MCV RBC AUTO: 94 FL (ref 82–98)
MONOCYTES # BLD AUTO: 0.11 THOUSAND/ΜL (ref 0.17–1.22)
MONOCYTES NFR BLD AUTO: 1 % (ref 4–12)
NEUTROPHILS # BLD AUTO: 7.32 THOUSANDS/ΜL (ref 1.85–7.62)
NEUTS SEG NFR BLD AUTO: 94 % (ref 43–75)
NRBC BLD AUTO-RTO: 0 /100 WBCS
O2 CT BLDV-SCNC: 19.4 ML/DL
P AXIS: 55 DEGREES
PCO2 BLDV: 39.3 MM HG (ref 42–50)
PH BLDV: 7.37 [PH] (ref 7.3–7.4)
PLATELET # BLD AUTO: 180 THOUSANDS/UL (ref 149–390)
PMV BLD AUTO: 10.9 FL (ref 8.9–12.7)
PO2 BLDV: 108.3 MM HG (ref 35–45)
POTASSIUM SERPL-SCNC: 3.7 MMOL/L (ref 3.5–5.3)
PR INTERVAL: 144 MS
PROCALCITONIN SERPL-MCNC: <0.05 NG/ML
QRS AXIS: 33 DEGREES
QRSD INTERVAL: 92 MS
QT INTERVAL: 384 MS
QTC INTERVAL: 467 MS
RBC # BLD AUTO: 4.41 MILLION/UL (ref 3.81–5.12)
SODIUM SERPL-SCNC: 140 MMOL/L (ref 135–147)
T WAVE AXIS: 79 DEGREES
VENTRICULAR RATE: 89 BPM
WBC # BLD AUTO: 7.86 THOUSAND/UL (ref 4.31–10.16)

## 2022-07-15 PROCEDURE — 93010 ELECTROCARDIOGRAM REPORT: CPT | Performed by: INTERNAL MEDICINE

## 2022-07-15 PROCEDURE — 85025 COMPLETE CBC W/AUTO DIFF WBC: CPT

## 2022-07-15 PROCEDURE — 99232 SBSQ HOSP IP/OBS MODERATE 35: CPT | Performed by: STUDENT IN AN ORGANIZED HEALTH CARE EDUCATION/TRAINING PROGRAM

## 2022-07-15 PROCEDURE — 76705 ECHO EXAM OF ABDOMEN: CPT

## 2022-07-15 PROCEDURE — 82805 BLOOD GASES W/O2 SATURATION: CPT

## 2022-07-15 PROCEDURE — 80048 BASIC METABOLIC PNL TOTAL CA: CPT

## 2022-07-15 PROCEDURE — 94760 N-INVAS EAR/PLS OXIMETRY 1: CPT

## 2022-07-15 PROCEDURE — 94640 AIRWAY INHALATION TREATMENT: CPT

## 2022-07-15 PROCEDURE — 84145 PROCALCITONIN (PCT): CPT

## 2022-07-15 RX ORDER — BENZONATATE 100 MG/1
100 CAPSULE ORAL 3 TIMES DAILY PRN
Status: DISCONTINUED | OUTPATIENT
Start: 2022-07-15 | End: 2022-07-17 | Stop reason: HOSPADM

## 2022-07-15 RX ORDER — NITROFURANTOIN 25; 75 MG/1; MG/1
100 CAPSULE ORAL 2 TIMES DAILY
Status: DISCONTINUED | OUTPATIENT
Start: 2022-07-15 | End: 2022-07-16

## 2022-07-15 RX ORDER — GUAIFENESIN 600 MG/1
1200 TABLET, EXTENDED RELEASE ORAL 2 TIMES DAILY
Status: DISCONTINUED | OUTPATIENT
Start: 2022-07-15 | End: 2022-07-17 | Stop reason: HOSPADM

## 2022-07-15 RX ORDER — PREDNISONE 20 MG/1
40 TABLET ORAL DAILY
Status: DISCONTINUED | OUTPATIENT
Start: 2022-07-16 | End: 2022-07-17 | Stop reason: HOSPADM

## 2022-07-15 RX ORDER — CEFUROXIME AXETIL 250 MG/1
500 TABLET ORAL EVERY 12 HOURS SCHEDULED
Status: CANCELLED | OUTPATIENT
Start: 2022-07-16 | End: 2022-07-21

## 2022-07-15 RX ADMIN — METHYLPREDNISOLONE SODIUM SUCCINATE 40 MG: 40 INJECTION, POWDER, FOR SOLUTION INTRAMUSCULAR; INTRAVENOUS at 22:08

## 2022-07-15 RX ADMIN — LEVALBUTEROL HYDROCHLORIDE 1.25 MG: 1.25 SOLUTION RESPIRATORY (INHALATION) at 14:20

## 2022-07-15 RX ADMIN — NICOTINE 1 PATCH: 14 PATCH, EXTENDED RELEASE TRANSDERMAL at 08:23

## 2022-07-15 RX ADMIN — IPRATROPIUM BROMIDE 0.5 MG: 0.5 SOLUTION RESPIRATORY (INHALATION) at 07:19

## 2022-07-15 RX ADMIN — BENZONATATE 100 MG: 100 CAPSULE ORAL at 17:09

## 2022-07-15 RX ADMIN — LEVALBUTEROL HYDROCHLORIDE 1.25 MG: 1.25 SOLUTION RESPIRATORY (INHALATION) at 07:19

## 2022-07-15 RX ADMIN — LISINOPRIL 5 MG: 5 TABLET ORAL at 08:26

## 2022-07-15 RX ADMIN — NITROFURANTOIN (MONOHYDRATE/MACROCRYSTALS) 100 MG: 75; 25 CAPSULE ORAL at 17:09

## 2022-07-15 RX ADMIN — METHYLPREDNISOLONE SODIUM SUCCINATE 40 MG: 40 INJECTION, POWDER, FOR SOLUTION INTRAMUSCULAR; INTRAVENOUS at 08:28

## 2022-07-15 RX ADMIN — GUAIFENESIN 1200 MG: 600 TABLET ORAL at 22:08

## 2022-07-15 RX ADMIN — IPRATROPIUM BROMIDE 0.5 MG: 0.5 SOLUTION RESPIRATORY (INHALATION) at 19:30

## 2022-07-15 RX ADMIN — LEVALBUTEROL HYDROCHLORIDE 1.25 MG: 1.25 SOLUTION RESPIRATORY (INHALATION) at 19:30

## 2022-07-15 RX ADMIN — IPRATROPIUM BROMIDE 0.5 MG: 0.5 SOLUTION RESPIRATORY (INHALATION) at 14:20

## 2022-07-15 RX ADMIN — AZITHROMYCIN 500 MG: 500 TABLET, FILM COATED ORAL at 22:08

## 2022-07-15 RX ADMIN — METHYLPREDNISOLONE SODIUM SUCCINATE 40 MG: 40 INJECTION, POWDER, FOR SOLUTION INTRAMUSCULAR; INTRAVENOUS at 15:55

## 2022-07-15 RX ADMIN — GUAIFENESIN 600 MG: 600 TABLET ORAL at 08:26

## 2022-07-15 RX ADMIN — NITROFURANTOIN (MONOHYDRATE/MACROCRYSTALS) 100 MG: 75; 25 CAPSULE ORAL at 08:26

## 2022-07-15 NOTE — PLAN OF CARE
Problem: Potential for Falls  Goal: Patient will remain free of falls  Description: INTERVENTIONS:  - Educate patient/family on patient safety including physical limitations  - Instruct patient to call for assistance with activity   - Consult OT/PT to assist with strengthening/mobility   - Keep Call bell within reach  - Keep bed low and locked with side rails adjusted as appropriate  - Keep care items and personal belongings within reach  - Initiate and maintain comfort rounds  - Offer Toileting every 2 Hours, in advance of need  - Apply yellow socks and bracelet for high fall risk patients  - Consider moving patient to room near nurses station  Outcome: Progressing     Problem: CARDIOVASCULAR - ADULT  Goal: Maintains optimal cardiac output and hemodynamic stability  Description: INTERVENTIONS:  - Monitor I/O, vital signs and rhythm  - Monitor for S/S and trends of decreased cardiac output  - Administer and titrate ordered vasoactive medications to optimize hemodynamic stability  - Assess quality of pulses, skin color and temperature  - Assess for signs of decreased coronary artery perfusion  - Instruct patient to report change in severity of symptoms  Outcome: Progressing     Problem: RESPIRATORY - ADULT  Goal: Achieves optimal ventilation and oxygenation  Description: INTERVENTIONS:  - Assess for changes in respiratory status  - Assess for changes in mentation and behavior  - Position to facilitate oxygenation and minimize respiratory effort  - Oxygen administered by appropriate delivery if ordered  - Initiate smoking cessation education as indicated  - Encourage broncho-pulmonary hygiene including cough, deep breathe, Incentive Spirometry  - Assess the need for suctioning and aspirate as needed  - Assess and instruct to report SOB or any respiratory difficulty  - Respiratory Therapy support as indicated  Outcome: Progressing

## 2022-07-15 NOTE — ASSESSMENT & PLAN NOTE
· POA with tachycardia (HR 95), tachypnea (RR 24)  · Afebrile, no leukocytosis, negative procal  No consolidation seen on CXR  · On azithromycin   · Trend WBCs/procal

## 2022-07-15 NOTE — ASSESSMENT & PLAN NOTE
· Noted on face and b/l shoulders with erythema, warmth, no blistering   Reports significant amount of time in sun  · Counseled on SPF use   · Supportive care

## 2022-07-15 NOTE — UTILIZATION REVIEW
Inpatient Admission Authorization Request   NOTIFICATION OF INPATIENT ADMISSION/INPATIENT AUTHORIZATION REQUEST   SERVICING FACILITY:   [place] [address]   [id number] [place] [address] [facility] [id number] [place] [address]  Tax ID: [id number]  NPI: [id number]  Place of Service: Inpatient [address]  Place of Service Code: 24     ATTENDING PROVIDER:  Attending Name and NPI#: Trinity Victor Hugo [[id number]]  Address[facility] [address] [place] [address] [facility], [address] [place] [address]  Phone:  [phone]     UTILIZATION REVIEW CONTACT:  Roxane Cabezas, Utilization Review Supervisor  Network Utilization Review Department  Phone: [phone]  Fax: [phone]  Email: [email or web address]     PHYSICIAN ADVISORY SERVICES:  FOR [facility] REVIEW - MEDICAL NECESSITY DENIAL  Phone: [phone]  Fax: [phone]  Email: [email or web address]     TYPE OF REQUEST:  Inpatient Status     ADMISSION INFORMATION:  ADMISSION DATE/TIME: 7/14/22 10:48 PM  PATIENT DIAGNOSIS CODE/DESCRIPTION:  Chest pain [R07 9]  SOB (shortness of breath) [R06 02]  Hypoxia [R09 02]  DISCHARGE DATE/TIME: No discharge date for patient encounter  IMPORTANT INFORMATION:  Please contact Idania Wooten directly with any questions or concerns regarding this request  Department voicemails are confidential     Send requests for admission clinical reviews, concurrent reviews, approvals, and administrative denials due to lack of clinical to fax [phone]

## 2022-07-15 NOTE — ASSESSMENT & PLAN NOTE
· C/o dysuria, urinary urgency     · UA not most impressive, however patient symptomatic  · Continue Macrobid for 5d course

## 2022-07-15 NOTE — ASSESSMENT & PLAN NOTE
· Patient BP above goal  · Increase home lisinopril from 2 5mg to 5mg  · Blood pressure acceptable on lisinopril 5 mg

## 2022-07-15 NOTE — ASSESSMENT & PLAN NOTE
CT scan on admission showing:     -Multiple hypodense lesions in the spleen, statistically most likely cysts or hemangiomata  Consider 3 month follow-up left upper quadrant ultrasound to ensure stability     -Probable left renal cyst which can also be confirmed with ultrasound    F/U Renal US

## 2022-07-15 NOTE — H&P
Yahaira U  68   H&P- Aimee Bragaadle 1965, 56 y o  female MRN: [id number]  Unit/Bed#: -01 Encounter: [id number]  Primary Care Provider: Bharathi Lerma MD   Date and time admitted to hospital: 7/14/2022  8:03 PM    * Acute respiratory failure with hypoxia ([address] )  Assessment & Plan  · Noted to be hypoxic in mid-80s on RA  Requiring between 2-4L NC   · Wean as able to maintain SpO2 > 88-90%   · CTA negative for PE   · CXR no consolidation   · Tx plan as below  · Respiratory protocol         COPD exacerbation (HCC)  Assessment & Plan  · C/o SOB, productive cough, R-sided chest pain x2 days  Denies sick contacts, fevers, chills  Was seen this a m  and d/c with prednisone/albuterol without improvement  Also with DuoNeb x2  Now with new oxygen requirement  Significant smoking hx  · Denies formal diagnosis  States some time ago was told she had COPD by her physician  · COVID negative   · CXR: no consolidation  · Start Solu-Medrol q8  · Xopenex/atrovent nebs t i d   · Azithromycin x3 d  · Mucinex   · Obtain sputum culture  · Recommend PFTs at d/c       Chest pain on breathing  Assessment & Plan  · Reports sharp, pleuritic chest pain x3 days  · Does not appears to be cardiac  · Likely from COPD exacerbation w/ cough   · SERVANDO 0  · ECG without ischemic changes, troponin negative   · Will monitor on tele overnight   · Notify provider for worsening CP or SOB    Acute solar dermatitis  Assessment & Plan  · Noted on face and b/l shoulders with erythema, warmth, no blistering  Reports significant amount of time in sun  · Counseled on SPF use   · Supportive care    Acute cystitis  Assessment & Plan  · C/o dysuria, urinary urgency     · UA not most impressive, however patient symptomatic  · Continue Macrobid for 5d course    Tobacco use  Assessment & Plan  · Smokes 1 5ppd   · Nicotine replacement  ·  on cessation     Primary hypertension  Assessment & Plan  · Patient BP above goal  · Increase home lisinopril from 2 5mg to 5mg    SIRS (systemic inflammatory response syndrome) (HCC)  Assessment & Plan  · POA with tachycardia (HR 95), tachypnea (RR 24)  · Afebrile, no leukocytosis, negative procal  No consolidation seen on CXR  · On azithromycin   · Trend WBCs/procal      VTE Pharmacologic Prophylaxis: VTE Score: 5 lovenox  Code Status: Level 1 - Full Code   Discussion with family: Patient declined call to   Anticipated Length of Stay: Patient will be admitted on an inpatient basis with an anticipated length of stay of greater than 2 midnights secondary to COPD exacerbation, acute hypoxic respiratory failure  Total Time for Visit, including Counseling / Coordination of Care: 60 minutes Greater than 50% of this total time spent on direct patient counseling and coordination of care  Chief Complaint: SOB, chest pain     History of Present Illness:  Kathie Rosario is a 56 y o  female with a PMH of HTN who presents with chest pain and SOB wprsening over the past week  Reports was seen in the ER x2 most recently this a m  where she was recommended for admisison which she declined  Was d/c with prednisone/albuterol  States she continued with increased SOB throughout the day prompting return to ED  Has associated productive cough with white sputum  Also with complaints of sharp, pleuritic chest pain made worse with cough  Denies hemoptysis  No fever, chills, sick contacts, abdominal pain, N/V/D  Does complain of difficulty with urination and dysuria for which she was prescribed Macrobid for  Had negative PE study  CXR without consolidation  COVID negative  States she has remote history of COPD but never had official diagnosis  Suspect underlying COPD given significant smoking hx  Review of Systems:  Review of Systems   Constitutional: Negative for appetite change, diaphoresis, fatigue and fever  HENT: Negative for congestion, sneezing and sore throat      Eyes: Negative for visual disturbance  Respiratory: Positive for cough, chest tightness, shortness of breath and wheezing  Cardiovascular: Positive for chest pain  Negative for palpitations and leg swelling  Gastrointestinal: Negative for abdominal pain, diarrhea, nausea and vomiting  Endocrine: Negative for polyuria  Genitourinary: Positive for difficulty urinating and dysuria  Negative for frequency and urgency  Musculoskeletal: Negative for myalgias  Skin: Positive for color change  Neurological: Negative for dizziness, syncope, weakness and light-headedness  Psychiatric/Behavioral: Negative for sleep disturbance  Past Medical and Surgical History:   Past Medical History:   Diagnosis Date    Hypertension     Psychiatric disorder        Past Surgical History:   Procedure Laterality Date    ABDOMINAL SURGERY      gallbladder removal       Meds/Allergies:  Prior to Admission medications    Medication Sig Start Date End Date Taking? Authorizing Provider   cefuroxime (CEFTIN) 500 mg tablet Take 1 tablet (500 mg total) by mouth every 12 (twelve) hours for 7 days 7/14/22 7/21/22  Vika Hathaway MD   lisinopril (ZESTRIL) 2 5 mg tablet Take 2 5 mg by mouth daily    Historical Provider, MD   methylPREDNISolone 4 MG tablet therapy pack Use as directed on package 7/14/22   Vika Hathaway MD   nitrofurantoin (MACROBID) 100 mg capsule Take 1 capsule (100 mg total) by mouth 2 (two) times a day for 5 days 7/9/22 7/14/22  Robert Maxwell PA-C   ondansetron (Zofran ODT) 4 mg disintegrating tablet Take 1 tablet (4 mg total) by mouth every 6 (six) hours as needed for nausea or vomiting 7/9/22   Robert Maxwell PA-C     I have reviewed home medications with patient personally      Allergies: No Known Allergies    Social History:  Marital Status: Single   Occupation:   Patient Pre-hospital Living Situation: Home  Patient Pre-hospital Level of Mobility: walks  Patient Pre-hospital Diet Restrictions: Substance Use History:   Social History     Substance and Sexual Activity   Alcohol Use Not Currently     Social History     Tobacco Use   Smoking Status Current Every Day Smoker    Packs/day: 0 50    Types: Cigarettes   Smokeless Tobacco Never Used     Social History     Substance and Sexual Activity   Drug Use Yes    Types: Marijuana    Comment: last used 07/07/22       Family History:  History reviewed  No pertinent family history  Physical Exam:     Vitals:   Blood Pressure: 162/92 (07/14/22 2300)  Pulse: 71 (07/14/22 2329)  Temperature: 97 5 °F (36 4 °C) (07/14/22 2300)  Temp Source: Oral (07/14/22 2300)  Respirations: 22 (07/14/22 2329)  Height: 5' 4" (162 6 cm) (07/14/22 2300)  Weight - Scale: 71 2 kg (157 lb) (07/14/22 2300)  SpO2: 96 % (07/15/22 0020)    Physical Exam  Constitutional:       General: She is not in acute distress  Appearance: She is not diaphoretic  HENT:      Head: Normocephalic and atraumatic  Nose: No congestion  Eyes:      Pupils: Pupils are equal, round, and reactive to light  Cardiovascular:      Rate and Rhythm: Normal rate and regular rhythm  Pulses: Normal pulses  Heart sounds: No murmur heard  Pulmonary:      Effort: Pulmonary effort is normal  No respiratory distress  Breath sounds: Wheezing present  No rhonchi or rales  Comments: In no acute distress  Speaking in full sentences  No accessory muscle usage  Lungs sounds decreased with expiratory wheezes in upper lung fields     Chest:      Chest wall: Tenderness present  Abdominal:      General: Abdomen is flat  Bowel sounds are normal       Palpations: Abdomen is soft  Tenderness: There is no abdominal tenderness  There is no guarding  Musculoskeletal:      Right lower leg: No edema  Left lower leg: No edema  Skin:     General: Skin is warm and dry  Capillary Refill: Capillary refill takes less than 2 seconds        Comments: Erythema and warmth of face and shoulder b/l  No blistering     Neurological:      General: No focal deficit present  Mental Status: She is alert and oriented to person, place, and time  Cranial Nerves: No cranial nerve deficit  Psychiatric:         Mood and Affect: Mood normal          Behavior: Behavior normal           Additional Data:   Lab Results:  Results from last 7 days   Lab Units 07/14/22 2035   WBC Thousand/uL 6 20   HEMOGLOBIN g/dL 14 3   HEMATOCRIT % 42 0   PLATELETS Thousands/uL 238   NEUTROS PCT % 92*   LYMPHS PCT % 6*   MONOS PCT % 2*   EOS PCT % 0     Results from last 7 days   Lab Units 07/14/22 2035   SODIUM mmol/L 140   POTASSIUM mmol/L 3 8   CHLORIDE mmol/L 106   CO2 mmol/L 23   BUN mg/dL 11   CREATININE mg/dL 0 78   ANION GAP mmol/L 11   CALCIUM mg/dL 9 7   ALBUMIN g/dL 4 5   TOTAL BILIRUBIN mg/dL 0 73   ALK PHOS U/L 55   ALT U/L 12   AST U/L 12*   GLUCOSE RANDOM mg/dL 165*         Results from last 7 days   Lab Units 07/14/22 2038   POC GLUCOSE mg/dl 170*         Results from last 7 days   Lab Units 07/14/22 2035   PROCALCITONIN ng/ml <0 05       Imaging: Reviewed radiology reports from this admission including: chest xray and CTA chest  XR chest 1 view portable   ED Interpretation by Asha Segovia PA-C (07/14 2131)   No acute cardiopulmonary abnormalities          EKG and Other Studies Reviewed on Admission:   · EKG: NSR    ** Please Note: This note has been constructed using a voice recognition system   **

## 2022-07-15 NOTE — UTILIZATION REVIEW
Initial Clinical Review    Admission: Date/Time/Statement:   Admission Orders (From admission, onward)     Ordered        07/14/22 2248  INPATIENT ADMISSION  Once                      Orders Placed This Encounter   Procedures    INPATIENT ADMISSION     Standing Status:   Standing     Number of Occurrences:   1     Order Specific Question:   Level of Care     Answer:   Med Surg [16]     Order Specific Question:   Bed Type     Answer:   [facility] [4]     Order Specific Question:   Estimated length of stay     Answer:   More than 2 Midnights     Order Specific Question:   Certification     Answer:   I certify that inpatient services are medically necessary for this patient for a duration of greater than two midnights  See H&P and MD Progress Notes for additional information about the patient's course of treatment  ED Arrival Information     Expected   -    Arrival   7/14/2022 20:01    Acuity   Urgent            Means of arrival   Walk-In    Escorted by   Self    Service   Hospitalist    Admission type   Urgent            Arrival complaint   SOB           Chief Complaint   Patient presents with    Shortness of Breath     Reports feeling short of breath, states she can't get enough to drink  "I'm so thirsty" States she took Albuterol inhaler an hour ago, no relief  Initial Presentation: 56 y o  female to ER from home,  C/o chest pain & SOB worsening over past week:    Pt had presented to ER  On 7/8  For  Nausea, dx w/UTI and dc on po abx  Returned to ER on  7/14 : found to be hypoxic on room air   (smoke half a pack cigarettes daily;  not on home oxygen)  D-dimer neg, sent for CTA anyway to ensure no PE vs  dissection;    2-troponins and no EKG changes to suggest an acute myocardial infarction  Given duoneb & pt reported feeling "better"   Examined euvolemic  Offered OBS stay for further evaluation but pt declined  DC home on medrol pack  and albuterol inhaler  Cont abx for UTI          Returns to ER  Same day (7/14)   late evening for c/o  rt sided ? Pleuritic chest pain worsening over past week  IN ER,   88% on RA while awake and HOB elevated  2L NC placed on pt  Pt 92-93% on 2L NC  Mild dry cough present  On exam mild decreased air entry b/l; does not appears fluid overloaded on exam  Rrr  States she felt better s/p duoneb (breath sounds remained diminished on exam)        CT PE negative for pulmonary embolism  Chest x-ray without any consolidations  Troponins negative and EKG without ischemic changes      Admitted  INPATIENT  Status ,  MS level of care   For  Acute resp failure w/hypoxia 2/2 COPD exacerbation associated w/SIRS   Will cont IV  Solu-Medrol Q8   Continue azithromycin for 3 days to help with inflammation   Continue nebs  Chest pain likely  Costochondritis (2/2 COPD cough)    For Cystitis Continue Macrobid    Date: 7/15     Day 2:    Pt reports "somewhat better"   Wheezing still present - still requiring supplemental oxygen (currently on 2L oxygen NC)  Cont IV Solu-Medrol Q 8 for 1 more day and transition to prednisone 40 daily starting tomorrow  UA noted for leukocyte esterase and bacteria       Continue 5 day course of Macrobid that had been prescribed by PCP  Blood pressure acceptable on lisinopril 5 mg  CT scan on admission showing: Multiple hypodense lesions in the spleen, statistically most likely cysts or hemangiomata     F/U Renal US    ---------------------------------------------------------------------------------------------------------------------------------------------------------------------------------------------------    ED Triage Vitals   Temperature Pulse Respirations Blood Pressure SpO2   07/14/22 2013 07/14/22 2007 07/14/22 2007 07/14/22 2007 07/14/22 2007   98 4 °F (36 9 °C) 95 20 (!) 166/102 94 %      Temp Source Heart Rate Source Patient Position - Orthostatic VS BP Location FiO2 (%)   07/14/22 2013 07/14/22 2007 07/14/22 2007 07/14/22 2007 -- Oral Monitor Sitting Left arm       Pain Score       07/15/22 0019       4          Wt Readings from Last 1 Encounters:   07/14/22 71 2 kg (157 lb)     Additional Vital Signs:   07/15/22 1500 98 5 °F  87 15 135/81 95 % -- -- -- Lying   07/15/22 1420 -- -- -- -- 95 % 28 2 L/min Nasal cannula --   07/15/22 1100 98 1 °F  61 18 130/76 96 % -- -- -- Lying   07/15/22 0840 -- -- -- -- 94 % 32 3 L/min Nasal cannula --   07/15/22 0720 -- -- -- -- 94 % 36 4 L/min Nasal cannula --   07/15/22 0706 98 1 °F 55 15 136/84 96 % -- -- -- Lying   07/15/22 0020 -- -- -- -- 96 % 40 5 L/min Nasal cannula --   07/14/22 2329 -- 71 22 -- 96 % -- -- -- --   07/14/22 2300 97 5 °F  68 18 162/92 96 % 40 5 L/min Nasal cannula Sitting   07/14/22 2146 -- -- 22 -- 98 % 28 2 L/min Nasal cannula --   07/14/22 2125 -- -- -- -- 94 % 36 4 L/min Nasal cannula --   07/14/22 2120 -- -- -- -- 97 % 32 3 L/min Nasal cannula        Pertinent Labs/Diagnostic Test Results:   XR chest 1 view portable   ED Interpretation by Teresa Ordoñez PA-C (07/14 2131)   No acute cardiopulmonary abnormalities         US left upper quadrant   7/15    1   Splenic lesions not visible by ultrasound but likely represent a benign etiology such as hemangiomas   In the absence of a known malignancy, these are statistically benign  2   Left renal cyst is not optimally visualized but has the appearance of a minimally complex cyst on the cine images   Recommend ultrasound follow-up in approximately 6 months            Results from last 7 days   Lab Units 07/14/22 2035   SARS-COV-2  Negative     Results from last 7 days   Lab Units 07/15/22  0505 07/14/22 2035 07/14/22  0251 07/08/22  2357   WBC Thousand/uL 7 86 6 20 6 69 7 91   HEMOGLOBIN g/dL 13 9 14 3 14 5 14 6   HEMATOCRIT % 41 4 42 0 42 4 42 9   PLATELETS Thousands/uL 180 238 225 224   NEUTROS ABS Thousands/µL 7 32 5 72 4 28 5 08         Results from last 7 days   Lab Units 07/15/22  0505 07/14/22  2035 07/14/22  0251 07/08/22  2357   SODIUM mmol/L 140 140 140 141   POTASSIUM mmol/L 3 7 3 8 3 2* 3 2*   CHLORIDE mmol/L 107 106 104 106   CO2 mmol/L 23 23 27 28   ANION GAP mmol/L 10 11 9 7   BUN mg/dL 11 11 8 12   CREATININE mg/dL 0 76 0 78 0 70 0 70   EGFR ml/min/1 73sq m 88 85 97 97   CALCIUM mg/dL 9 3 9 7 9 4 9 4   MAGNESIUM mg/dL  --  2 0  --   --      Results from last 7 days   Lab Units 07/14/22 2035 07/14/22  0251 07/08/22  2357   AST U/L 12* 10* 13   ALT U/L 12 8 10   ALK PHOS U/L 55 48 54   TOTAL PROTEIN g/dL 7 3 7 1 7 0   ALBUMIN g/dL 4 5 4 4 4 3   TOTAL BILIRUBIN mg/dL 0 73 0 94 1 02*     Results from last 7 days   Lab Units 07/14/22  2038   POC GLUCOSE mg/dl 170*     Results from last 7 days   Lab Units 07/15/22  0505 07/14/22 2035 07/14/22  0251 07/08/22  2357   GLUCOSE RANDOM mg/dL 180* 165* 117 96       Results from last 7 days   Lab Units 07/14/22  2210   PH ART  7 442   PCO2 ART mm Hg 30 9*   PO2 ART mm Hg 51 8*   HCO3 ART mmol/L 20 6*   BASE EXC ART mmol/L -2 4   O2 CONTENT ART mL/dL 17 7   O2 HGB, ARTERIAL % 85 6*     Results from last 7 days   Lab Units 07/15/22  0505   PH HEATHER  7 365   PCO2 HEATHER mm Hg 39 3*   PO2 HEATHER mm Hg 108 3*   HCO3 HEATHER mmol/L 22 0*   BASE EXC HEATHER mmol/L -3 1   O2 CONTENT HEATHER ml/dL 19 4   O2 HGB, VENOUS % 94 6*     Results from last 7 days   Lab Units 07/14/22 2035 07/14/22  0713 07/14/22  0446 07/14/22  0251   HS TNI 0HR ng/L 3  --   --  4   HS TNI 2HR ng/L  --   --  4  --    HSTNI D2 ng/L  --   --  0  --    HS TNI 4HR ng/L  --  3  --   --    HSTNI D4 ng/L  --  -1  --   --      Results from last 7 days   Lab Units 07/14/22  0339   D-DIMER QUANTITATIVE ug/ml FEU 0 45     Results from last 7 days   Lab Units 07/15/22  0505 07/14/22 2035   PROCALCITONIN ng/ml <0 05 <0 05     Results from last 7 days   Lab Units 07/14/22 2035   BNP pg/mL 90     Results from last 7 days   Lab Units 07/08/22  2357   LIPASE u/L 14     Results from last 7 days   Lab Units 07/14/22  0254 07/09/22  0000   CLARITY UA  Clear Clear   COLOR UA  Yellow Yellow   SPEC GRAV UA  1 025 >=1 030   PH UA  6 5 6 0   GLUCOSE UA mg/dl Negative Negative   KETONES UA mg/dl Negative Negative   BLOOD UA  Negative Negative   PROTEIN UA mg/dl Negative Negative   NITRITE UA  Negative Negative   BILIRUBIN UA  Negative Negative   UROBILINOGEN UA E U /dl 0 2 0 2   LEUKOCYTES UA  2+* Trace*   WBC UA /hpf 0-5 4-10*   RBC UA /hpf None Seen 1-2   BACTERIA UA /hpf Moderate* Moderate*   EPITHELIAL CELLS WET PREP /hpf Occasional Moderate*   MUCUS THREADS  Innumerable* Moderate*     Results from last 7 days   Lab Units 07/14/22  0254   URINE CULTURE  <10,000 cfu/ml        ED Treatment:   Medication Administration from 07/14/2022 2001 to 07/14/2022 2320       Date/Time Order Dose Route Action     07/14/2022 2041 ipratropium-albuterol (DUO-NEB) 0 5-2 5 mg/3 mL inhalation solution 3 mL 3 mL Nebulization Given     07/14/2022 2039 ipratropium-albuterol (DUO-NEB) 0 5-2 5 mg/3 mL inhalation solution 3 mL 3 mL Nebulization Given     07/14/2022 2145 predniSONE tablet 50 mg 50 mg Oral Given        Past Medical History:   Diagnosis Date    Hypertension     Psychiatric disorder      Present on Admission:   Abnormal finding on CT scan      Admitting Diagnosis: Chest pain [R07 9]  SOB (shortness of breath) [R06 02]  Hypoxia [R09 02]  Age/Sex: 56 y o  female  Admission Orders:   See above;  Supplemental oxygen prn ;  Continuous pulse oximetry;  I/O q shift;  VS q 4 hr;  Cardiac diet    Scheduled Medications:  azithromycin, 500 mg, Oral, Q24H  enoxaparin, 40 mg, Subcutaneous, Daily  guaiFENesin, 600 mg, Oral, BID  ipratropium, 0 5 mg, Nebulization, TID  levalbuterol, 1 25 mg, Nebulization, TID  lisinopril, 5 mg, Oral, Daily  methylPREDNISolone sodium succinate, 40 mg, Intravenous, Q8H  nicotine, 1 patch, Transdermal, Daily  nitrofurantoin, 100 mg, Oral, BID  [START ON 7/16/2022] predniSONE, 40 mg, Oral, Daily      Continuous IV Infusions:     PRN Meds:  acetaminophen, 650 mg, Oral, Q6H PRN      Network Utilization Review Department  ATTENTION: Please call with any questions or concerns to [phone] and carefully listen to the prompts so that you are directed to the right person  All voicemails are confidential   Tawana Cifuentes all requests for admission clinical reviews, approved or denied determinations and any other requests to dedicated fax number below belonging to the campus where the patient is receiving treatment   List of dedicated fax numbers for the Facilities:  [address] DENIALS (Administrative/Medical Necessity) [phone]   [address] (Maternity/NICU/Pediatrics) [phone]   [address]  [address] [facility] [place] [address] [address] [address] [facility] [place] [address] [phone]

## 2022-07-15 NOTE — ASSESSMENT & PLAN NOTE
· C/o SOB, productive cough, R-sided chest pain x2 days  Denies sick contacts, fevers, chills  Was seen this a m  and d/c with prednisone/albuterol without improvement  Also with DuoNeb x2  Now with new oxygen requirement  Significant smoking hx  · Denies formal diagnosis   States some time ago was told she had COPD by her physician  · COVID negative   · CXR: no consolidation  · Will continue with Solu-Medrol Q 8 for 1 day and transition to prednisone 40 daily starting tomorrow for 4 more days to complete a 5 day course of steroids for COPD exacerbation  · Xopenex/atrovent nebs t i d   · Azithromycin x3 d  · Mucinex   · Obtain sputum culture  · Recommend PFTs at d/c

## 2022-07-15 NOTE — ASSESSMENT & PLAN NOTE
· Noted to be hypoxic in mid-80s on RA   Requiring between 2-4L NC   · Wean as able to maintain SpO2 > 88-90%   · CTA negative for PE   · CXR no consolidation   · Tx plan as below  · Respiratory protocol

## 2022-07-15 NOTE — ASSESSMENT & PLAN NOTE
· Reports sharp, pleuritic chest pain x3 days  · Does not appears to be cardiac  · Likely from COPD exacerbation w/ cough   · SERVANDO 0  · ECG without ischemic changes, troponin negative   · Will monitor on tele overnight   · Notify provider for worsening CP or SOB

## 2022-07-15 NOTE — ASSESSMENT & PLAN NOTE
· C/o SOB, productive cough, R-sided chest pain x2 days  Denies sick contacts, fevers, chills  Was seen this a m  and d/c with prednisone/albuterol without improvement  Also with DuoNeb x2  Now with new oxygen requirement  Significant smoking hx  · Denies formal diagnosis   States some time ago was told she had COPD by her physician  · COVID negative   · CXR: no consolidation  · Start Solu-Medrol q8  · Xopenex/atrovent nebs t i d   · Azithromycin x3 d  · Mucinex   · Obtain sputum culture  · Recommend PFTs at d/c

## 2022-07-15 NOTE — ASSESSMENT & PLAN NOTE
· C/o dysuria, urinary urgency     · UA noted for leukocyte esterase and bacteria  · Continue 5 day course of Macrobid that had been prescribed by PCP

## 2022-07-15 NOTE — ASSESSMENT & PLAN NOTE
· Reports sharp, pleuritic chest pain x3 days  · Does not appears to be cardiac  · Likely costochondritis from COPD exacerbation w/ cough   · SERVANDO 0  · ECG without ischemic changes, troponin negative x3  · Will monitor on tele overnight   · Notify provider for worsening CP or SOB

## 2022-07-15 NOTE — RESPIRATORY THERAPY NOTE
RT Protocol Note  Abhi Davis 56 y o  female MRN: [id number]  Unit/Bed#: -01 Encounter: [id number]    Assessment    Active Problems:    Acute respiratory failure with hypoxia (HCC)    COPD exacerbation (HCC)    SIRS (systemic inflammatory response syndrome) (HCC)      Home Pulmonary Medications:  None       Past Medical History:   Diagnosis Date    Hypertension     Psychiatric disorder      Social History     Socioeconomic History    Marital status: Single     Spouse name: None    Number of children: None    Years of education: None    Highest education level: None   Occupational History    None   Tobacco Use    Smoking status: Current Every Day Smoker     Packs/day: 0 50     Types: Cigarettes    Smokeless tobacco: Never Used   Vaping Use    Vaping Use: Never used   Substance and Sexual Activity    Alcohol use: Not Currently    Drug use: Yes     Types: Marijuana     Comment: last used 07/07/22    Sexual activity: None   Other Topics Concern    None   Social History Narrative    None     Social Determinants of Health     Financial Resource Strain: Not on file   Food Insecurity: Not on file   Transportation Needs: Not on file   Physical Activity: Not on file   Stress: Not on file   Social Connections: Not on file   Intimate Partner Violence: Not on file   Housing Stability: Not on file       Subjective         Objective    Physical Exam:        Vitals:  Blood pressure (!) 166/102, pulse 95, temperature 98 4 °F (36 9 °C), temperature source Oral, resp  rate 22, SpO2 98 %  Results from last 7 days   Lab Units 07/14/22  2210   PH ART  7 442   PCO2 ART mm Hg 30 9*   PO2 ART mm Hg 51 8*   HCO3 ART mmol/L 20 6*   BASE EXC ART mmol/L -2 4   O2 CONTENT ART mL/dL 17 7   O2 HGB, ARTERIAL % 85 6*       Imaging and other studies: I have personally reviewed pertinent reports  Plan    Respiratory Plan: Mild Distress pathway      Will keep treatments as ordered since patient still has shortness of breath

## 2022-07-15 NOTE — PROGRESS NOTES
Yahaira U  68   Progress Note - Vinita Cheadle 1965, 56 y o  female MRN: [id number]  Unit/Bed#: -01 Encounter: [id number]  Primary Care Provider: Bharathi Lerma MD   Date and time admitted to hospital: 7/14/2022  8:03 PM    * [facility]  Assessment & Plan  · C/o SOB, productive cough, R-sided chest pain x2 days  Denies sick contacts, fevers, chills  Was seen this a m  and d/c with prednisone/albuterol without improvement  Also with DuoNeb x2  Now with new oxygen requirement  Significant smoking hx  · Denies formal diagnosis  States some time ago was told she had COPD by her physician  · COVID negative   · CXR: no consolidation  · Will continue with Solu-Medrol Q 8 for 1 day and transition to prednisone 40 daily starting tomorrow for 4 more days to complete a 5 day course of steroids for COPD exacerbation  · Xopenex/atrovent nebs t i d   · Azithromycin x3 d  · Mucinex   · Obtain sputum culture  · Recommend PFTs at d/c       Acute respiratory failure with hypoxia ([facility] )  Assessment & Plan  · Noted to be hypoxic in mid-80s on RA  Requiring between 2-4L NC   · Wean as able to maintain SpO2 > 88-90%   · CTA negative for PE   · CXR no consolidation   · Tx plan as below  · Respiratory protocol         Acute cystitis  Assessment & Plan  · C/o dysuria, urinary urgency  · UA noted for leukocyte esterase and bacteria  · Continue 5 day course of Macrobid that had been prescribed by PCP    Primary hypertension  Assessment & Plan  · Patient BP above goal  · Increase home lisinopril from 2 5mg to 5mg  · Blood pressure acceptable on lisinopril 5 mg    Abnormal finding on CT scan  Assessment & Plan  CT scan on admission showing:     -Multiple hypodense lesions in the spleen, statistically most likely cysts or hemangiomata    Consider 3 month follow-up left upper quadrant ultrasound to ensure stability     -Probable left renal cyst which can also be confirmed with ultrasound  F/U Renal US    Acute solar dermatitis  Assessment & Plan  · Noted on face and b/l shoulders with erythema, warmth, no blistering  Reports significant amount of time in sun  · Counseled on SPF use   · Supportive care    Chest pain on breathing  Assessment & Plan  · Reports sharp, pleuritic chest pain x3 days  · Does not appears to be cardiac  · Likely costochondritis from COPD exacerbation w/ cough   · SERVANDO 0  · ECG without ischemic changes, troponin negative x3  · Will monitor on tele overnight   · Notify provider for worsening CP or SOB    Tobacco use  Assessment & Plan  · Smokes 1 5ppd   · Nicotine replacement  ·  on cessation     SIRS (systemic inflammatory response syndrome) (HCC)  Assessment & Plan  · POA with tachycardia (HR 95), tachypnea (RR 24)  · Afebrile, no leukocytosis, negative procal  No consolidation seen on CXR  · On azithromycin   · Trend WBCs/procal          VTE Pharmacologic Prophylaxis: VTE Score: 5 High Risk (Score >/= 5) - Pharmacological DVT Prophylaxis Ordered: enoxaparin (Lovenox)  Sequential Compression Devices Ordered  Patient Centered Rounds: I performed bedside rounds with nursing staff today  Discussions with Specialists or Other Care Team Provider: case management    Education and Discussions with Family / Patient: Patient declined call to   Time Spent for Care: 30 minutes  More than 50% of total time spent on counseling and coordination of care as described above  Current Length of Stay: 1 day(s)  Current Patient Status: Inpatient   Certification Statement: The patient will continue to require additional inpatient hospital stay due to COPD exacerbation  Discharge Plan: Anticipate discharge in 48-72 hrs to discharge location to be determined pending rehab evaluations  Code Status: Level 1 - Full Code    Subjective:   Patient seen examined at bedside  Admits to having some shortness of breath and intermittent dry cough    States that she continues to have chest pain with coughing and breathing  Denies any fevers or chills    Objective:     Vitals:   Temp (24hrs), Av °F (36 7 °C), Min:97 5 °F (36 4 °C), Max:98 4 °F (36 9 °C)    Temp:  [97 5 °F (36 4 °C)-98 4 °F (36 9 °C)] 98 1 °F (36 7 °C)  HR:  [55-95] 55  Resp:  [15-24] 15  BP: (136-166)/() 136/84  SpO2:  [94 %-98 %] 94 %  Body mass index is 26 95 kg/m²  Input and Output Summary (last 24 hours): Intake/Output Summary (Last 24 hours) at 7/15/2022 1055  Last data filed at 2022 2329  Gross per 24 hour   Intake --   Output 100 ml   Net -100 ml       Physical Exam:   Physical Exam  Vitals reviewed  HENT:      Head: Normocephalic and atraumatic  Right Ear: External ear normal       Left Ear: External ear normal       Nose: Nose normal       Mouth/Throat:      Mouth: Mucous membranes are moist       Pharynx: Oropharynx is clear  Eyes:      Extraocular Movements: Extraocular movements intact  Cardiovascular:      Rate and Rhythm: Normal rate and regular rhythm  Pulses: Normal pulses  Heart sounds: Normal heart sounds  Pulmonary:      Effort: Pulmonary effort is normal       Breath sounds: Wheezing present  Abdominal:      General: Abdomen is flat  Palpations: Abdomen is soft  Tenderness: There is no abdominal tenderness  Musculoskeletal:         General: Normal range of motion  Cervical back: Normal range of motion  Skin:     General: Skin is warm and dry  Neurological:      General: No focal deficit present  Mental Status: She is alert  Mental status is at baseline     Psychiatric:         Mood and Affect: Mood normal          Behavior: Behavior normal           Additional Data:     Labs:  Results from last 7 days   Lab Units 07/15/22  0505   WBC Thousand/uL 7 86   HEMOGLOBIN g/dL 13 9   HEMATOCRIT % 41 4   PLATELETS Thousands/uL 180   NEUTROS PCT % 94*   LYMPHS PCT % 5*   MONOS PCT % 1*   EOS PCT % 0     Results from last 7 days Lab Units 07/15/22  0505 07/14/22 2035   SODIUM mmol/L 140 140   POTASSIUM mmol/L 3 7 3 8   CHLORIDE mmol/L 107 106   CO2 mmol/L 23 23   BUN mg/dL 11 11   CREATININE mg/dL 0 76 0 78   ANION GAP mmol/L 10 11   CALCIUM mg/dL 9 3 9 7   ALBUMIN g/dL  --  4 5   TOTAL BILIRUBIN mg/dL  --  0 73   ALK PHOS U/L  --  55   ALT U/L  --  12   AST U/L  --  12*   GLUCOSE RANDOM mg/dL 180* 165*         Results from last 7 days   Lab Units 07/14/22 2038   POC GLUCOSE mg/dl 170*         Results from last 7 days   Lab Units 07/15/22  0505 07/14/22 2035   PROCALCITONIN ng/ml <0 05 <0 05       Lines/Drains:  Invasive Devices  Report    Peripheral Intravenous Line  Duration           Peripheral IV 07/14/22 Right Antecubital <1 day                  Telemetry:  Telemetry Orders (From admission, onward)             48 Hour Telemetry Monitoring  Continuous x 48 hours        References:    Telemetry Guidelines   Question:  Reason for 48 Hour Telemetry  Answer:  Acute MI, chest pain - R/O MI, or unstable angina                 Telemetry Reviewed: No data on telemetry  Indication for Continued Telemetry Use: No indication for continued use  Will discontinue                Imaging: Reviewed radiology reports from this admission including: chest CT scan and abdominal/pelvic CT    Recent Cultures (last 7 days):   Results from last 7 days   Lab Units 07/14/22  0254   URINE CULTURE  <10,000 cfu/ml        Last 24 Hours Medication List:   Current Facility-Administered Medications   Medication Dose Route Frequency Provider Last Rate    acetaminophen  650 mg Oral Q6H PRN Lauren Tarry Galeazzi, PA-C      azithromycin  500 mg Oral Q24H Lauren Tarry Galeazzi, PA-ERUM      enoxaparin  40 mg Subcutaneous Daily Lauren Tarry Galeazzi, PA-C      guaiFENesin  600 mg Oral BID Lauren Tarry Galeazzi, PA-C      ipratropium  0 5 mg Nebulization TID [place] Gunner, PA-ERUM      levalbuterol  1 25 mg Nebulization TID Mark Gunjeanne CINDY      lisinopril  5 mg Oral Daily Yumiko Bocanegra PA-C      methylPREDNISolone sodium succinate  40 mg Intravenous Q8H Drea Loya DO      nicotine  1 patch Transdermal Daily Yumiko Bocanegra PA-C      nitrofurantoin  100 mg Oral BID Yumiko Bocanegra PA-C      [START ON 7/16/2022] predniSONE  40 mg Oral Daily Drea Loya DO          Today, Patient Was Seen By: Bibi Turpin DO    **Please Note: This note may have been constructed using a voice recognition system  **

## 2022-07-15 NOTE — ED NOTES
Pt oxygen found at 84% on RA  Put on 3L NC  Provider made aware        Jeremy Guzmán RN  07/14/22 2120

## 2022-07-15 NOTE — ED PROVIDER NOTES
History  Chief Complaint   Patient presents with    Shortness of Breath     Reports feeling short of breath, states she can't get enough to drink  "I'm so thirsty" States she took Albuterol inhaler an hour ago, no relief  Patient is a 61-year-old female presents emergency department today with concern for chest pain shortness of breath x1 day  Patient states she was in the emergency department this morning with similar complaint, states she was told she should probably stain the hospital but wanted to leave  Was prescribed a new antibiotic for her UTI and steroids states she has not taken the steroids yet  Was also given albuterol but states there was no relief  She is currently complaining of shortness of breath which she describes is unable to get enough air into her lungs along with chest pain described as a intermittent sharp mid sternal and pressure-like sensation that does not radiate  She also mentioned she is extremely thirsty all day denies any known history of diabetes  She admits to history of smoking but denies any known history of asthma or COPD  She is denying any fevers or chills, headaches, neck or back pain, swelling of the lower extremities, abdominal pain, nausea vomiting, lightheadedness or dizziness  Prior to Admission Medications   Prescriptions Last Dose Informant Patient Reported? Taking?    cefuroxime (CEFTIN) 500 mg tablet 7/14/2022 at Unknown time  No Yes   Sig: Take 1 tablet (500 mg total) by mouth every 12 (twelve) hours for 7 days   lisinopril (ZESTRIL) 2 5 mg tablet 7/14/2022 at Unknown time Self Yes Yes   Sig: Take 2 5 mg by mouth daily   methylPREDNISolone 4 MG tablet therapy pack 7/14/2022 at Unknown time  No Yes   Sig: Use as directed on package   nitrofurantoin (MACROBID) 100 mg capsule   No No   Sig: Take 1 capsule (100 mg total) by mouth 2 (two) times a day for 5 days   ondansetron (Zofran ODT) 4 mg disintegrating tablet Unknown at Unknown time  No No   Sig: Take 1 tablet (4 mg total) by mouth every 6 (six) hours as needed for nausea or vomiting      Facility-Administered Medications: None       Past Medical History:   Diagnosis Date    Hypertension     Psychiatric disorder        Past Surgical History:   Procedure Laterality Date    ABDOMINAL SURGERY      gallbladder removal       History reviewed  No pertinent family history  I have reviewed and agree with the history as documented  E-Cigarette/Vaping    E-Cigarette Use Never User      E-Cigarette/Vaping Substances     Social History     Tobacco Use    Smoking status: Current Every Day Smoker     Packs/day: 0 50     Types: Cigarettes    Smokeless tobacco: Never Used   Vaping Use    Vaping Use: Never used   Substance Use Topics    Alcohol use: Not Currently    Drug use: Yes     Types: Marijuana     Comment: last used 07/07/22       Review of Systems   Constitutional: Negative for chills and fever  HENT: Negative for congestion, rhinorrhea and sore throat  Respiratory: Positive for shortness of breath  Negative for cough and chest tightness  Cardiovascular: Positive for chest pain  Gastrointestinal: Negative for abdominal pain, diarrhea, nausea and vomiting  Endocrine: Positive for polydipsia  Genitourinary: Negative for dysuria, flank pain, frequency and urgency  Musculoskeletal: Negative for back pain and neck pain  Neurological: Negative for dizziness, weakness, light-headedness and headaches  All other systems reviewed and are negative  Physical Exam  Physical Exam  Vitals and nursing note reviewed  Constitutional:       General: She is not in acute distress  Appearance: Normal appearance  She is well-developed  HENT:      Head: Normocephalic and atraumatic  Eyes:      Conjunctiva/sclera: Conjunctivae normal    Cardiovascular:      Rate and Rhythm: Normal rate and regular rhythm  Heart sounds: Normal heart sounds  No murmur heard    Pulmonary:      Effort: Pulmonary effort is normal  No respiratory distress  Breath sounds: Normal breath sounds  No decreased breath sounds, wheezing or rales  Chest:      Chest wall: No tenderness  Abdominal:      Palpations: Abdomen is soft  Tenderness: There is no abdominal tenderness  Musculoskeletal:      Cervical back: Neck supple  Right lower leg: No edema  Left lower leg: No edema  Skin:     General: Skin is warm and dry  Neurological:      General: No focal deficit present  Mental Status: She is alert and oriented to person, place, and time  Psychiatric:         Attention and Perception: Attention normal          Mood and Affect: Mood normal          Speech: Speech normal          Behavior: Behavior normal  Behavior is cooperative  Thought Content:  Thought content normal          Judgment: Judgment normal          Vital Signs  ED Triage Vitals   Temperature Pulse Respirations Blood Pressure SpO2   07/14/22 2013 07/14/22 2007 07/14/22 2007 07/14/22 2007 07/14/22 2007   98 4 °F (36 9 °C) 95 20 (!) 166/102 94 %      Temp Source Heart Rate Source Patient Position - Orthostatic VS BP Location FiO2 (%)   07/14/22 2013 07/14/22 2007 07/14/22 2007 07/14/22 2007 --   Oral Monitor Sitting Left arm       Pain Score       07/15/22 0019       4           Vitals:    07/14/22 2007 07/14/22 2300 07/14/22 2329   BP: (!) 166/102 162/92    Pulse: 95 68 71   Patient Position - Orthostatic VS: Sitting Sitting          Visual Acuity      ED Medications  Medications   nicotine (NICODERM CQ) 14 mg/24hr TD 24 hr patch 1 patch (has no administration in time range)   acetaminophen (TYLENOL) tablet 650 mg (has no administration in time range)   enoxaparin (LOVENOX) subcutaneous injection 40 mg (has no administration in time range)   guaiFENesin (MUCINEX) 12 hr tablet 600 mg (has no administration in time range)   levalbuterol (XOPENEX) inhalation solution 1 25 mg (1 25 mg Nebulization Given 7/14/22 9589) ipratropium (ATROVENT) 0 02 % inhalation solution 0 5 mg (0 5 mg Nebulization Given 7/14/22 2329)   methylPREDNISolone sodium succinate (Solu-MEDROL) injection 40 mg (40 mg Intravenous Given 7/14/22 2331)   azithromycin (ZITHROMAX) tablet 500 mg (500 mg Oral Given 7/14/22 2331)   lisinopril (ZESTRIL) tablet 5 mg (has no administration in time range)   nitrofurantoin (MACROBID) extended-release capsule 100 mg (has no administration in time range)   predniSONE tablet 50 mg (50 mg Oral Given 7/14/22 2145)       Diagnostic Studies  Results Reviewed     Procedure Component Value Units Date/Time    CBC and differential [[id number]]  (Normal) Collected: 07/15/22 0505    Lab Status: Preliminary result Specimen: Blood from Arm, Right Updated: 07/15/22 0524     WBC 7 86 Thousand/uL      RBC 4 41 Million/uL      Hemoglobin 13 9 g/dL      Hematocrit 41 4 %      MCV 94 fL      MCH 31 5 pg      MCHC 33 6 g/dL      RDW 12 9 %      MPV 10 9 fL      Platelets 615 Thousands/uL     Procalcitonin, Next Day AM Collection [[id number]] Collected: 07/15/22 0505    Lab Status: In process Specimen: Blood from Arm, Right Updated: 07/15/22 6124    Basic metabolic panel [[id number]] Collected: 07/15/22 0505    Lab Status:  In process Specimen: Blood from Arm, Right Updated: 07/15/22 0515    Procalcitonin [[id number]]  (Normal) Collected: 07/14/22 2035    Lab Status: Final result Specimen: Blood from Arm, Right Updated: 07/14/22 2325     Procalcitonin <0 05 ng/ml     Blood gas, arterial [[id number]]  (Abnormal) Collected: 07/14/22 2210    Lab Status: Final result Specimen: Blood, Arterial Updated: 07/14/22 2218     pH, Arterial 7 442     pCO2, Arterial 30 9 mm Hg      pO2, Arterial 51 8 mm Hg      HCO3, Arterial 20 6 mmol/L      Base Excess, Arterial -2 4 mmol/L      O2 Content, Arterial 17 7 mL/dL      O2 HGB,Arterial  85 6 %     B-Type Natriuretic Peptide(BNP) AN, CA, EA Campuses Only [[id number]]  (Normal) Collected: 07/14/22 2035    Lab Status: Final result Specimen: Blood from Arm, Right Updated: 07/14/22 2146     BNP 90 pg/mL     COVID only [[id number]]  (Normal) Collected: 07/14/22 2035    Lab Status: Final result Specimen: Nares from Nose Updated: 07/14/22 2133     SARS-CoV-2 Negative    Narrative:      FOR PEDIATRIC PATIENTS - copy/paste COVID Guidelines URL to browser: [email or web address]  [email or web address]    SARS-CoV-2 assay is a Nucleic Acid Amplification assay intended for the  qualitative detection of nucleic acid from SARS-CoV-2 in nasopharyngeal  swabs  Results are for the presumptive identification of SARS-CoV-2 RNA  Positive results are indicative of infection with SARS-CoV-2, the virus  causing COVID-19, but do not rule out bacterial infection or co-infection  with other viruses  Laboratories within the [place] and its  territories are required to report all positive results to the appropriate  public health authorities  Negative results do not preclude SARS-CoV-2  infection and should not be used as the sole basis for treatment or other  patient management decisions  Negative results must be combined with  clinical observations, patient history, and epidemiological information  This test has not been FDA cleared or approved  This test has been authorized by FDA under an Emergency Use Authorization  (EUA)  This test is only authorized for the duration of time the  declaration that circumstances exist justifying the authorization of the  emergency use of an in vitro diagnostic tests for detection of SARS-CoV-2  virus and/or diagnosis of COVID-19 infection under section 564(b)(1) of  the Act, 21 U  S C  357FRV-7(M)(7), unless the authorization is terminated  or revoked sooner  The test has been validated but independent review by FDA  and CLIA is pending  Test performed using [employer]: This RT-PCR assay targets N2,  a region unique to SARS-CoV-2   A conserved region in the E-gene was chosen  for pan-Sarbecovirus detection which includes SARS-CoV-2  CBC and differential [[id number]]  (Abnormal) Collected: 07/14/22 2035    Lab Status: Final result Specimen: Blood from Arm, Right Updated: 07/14/22 2119     WBC 6 20 Thousand/uL      RBC 4 52 Million/uL      Hemoglobin 14 3 g/dL      Hematocrit 42 0 %      MCV 93 fL      MCH 31 6 pg      MCHC 34 0 g/dL      RDW 12 7 %      MPV 10 5 fL      Platelets 747 Thousands/uL      nRBC 0 /100 WBCs      Neutrophils Relative 92 %      Immat GRANS % 0 %      Lymphocytes Relative 6 %      Monocytes Relative 2 %      Eosinophils Relative 0 %      Basophils Relative 0 %      Neutrophils Absolute 5 72 Thousands/µL      Immature Grans Absolute 0 02 Thousand/uL      Lymphocytes Absolute 0 35 Thousands/µL      Monocytes Absolute 0 11 Thousand/µL      Eosinophils Absolute 0 00 Thousand/µL      Basophils Absolute 0 00 Thousands/µL     Narrative: This is an appended report  These results have been appended to a previously verified report      HS Troponin 0hr (reflex protocol) [[id number]]  (Normal) Collected: 07/14/22 2035    Lab Status: Final result Specimen: Blood from Arm, Right Updated: 07/14/22 2109     hs TnI 0hr 3 ng/L     Comprehensive metabolic panel [[id number]]  (Abnormal) Collected: 07/14/22 2035    Lab Status: Final result Specimen: Blood from Arm, Right Updated: 07/14/22 2102     Sodium 140 mmol/L      Potassium 3 8 mmol/L      Chloride 106 mmol/L      CO2 23 mmol/L      ANION GAP 11 mmol/L      BUN 11 mg/dL      Creatinine 0 78 mg/dL      Glucose 165 mg/dL      Calcium 9 7 mg/dL      AST 12 U/L      ALT 12 U/L      Alkaline Phosphatase 55 U/L      Total Protein 7 3 g/dL      Albumin 4 5 g/dL      Total Bilirubin 0 73 mg/dL      eGFR 85 ml/min/1 73sq m     Narrative:      [facility] [place] guidelines for Chronic Kidney Disease (CKD):     Stage 1 with normal or high GFR (GFR > 90 mL/min/1 73 square meters)    Stage 2 Mild CKD (GFR = 60-89 mL/min/1 73 square meters)    Stage 3A Moderate CKD (GFR = 45-59 mL/min/1 73 square meters)    Stage 3B Moderate CKD (GFR = 30-44 mL/min/1 73 square meters)    Stage 4 Severe CKD (GFR = 15-29 mL/min/1 73 square meters)    Stage 5 End Stage CKD (GFR <15 mL/min/1 73 square meters)  Note: GFR calculation is accurate only with a steady state creatinine    Magnesium [[id number]]  (Normal) Collected: 07/14/22 2035    Lab Status: Final result Specimen: Blood from Arm, Right Updated: 07/14/22 2102     Magnesium 2 0 mg/dL     Fingerstick Glucose (POCT) [[id number]]  (Abnormal) Collected: 07/14/22 2038    Lab Status: Final result Updated: 07/14/22 2039     POC Glucose 170 mg/dl                  XR chest 1 view portable   ED Interpretation by Isabella Fair PA-C (07/14 2131)   No acute cardiopulmonary abnormalities                 Procedures  ECG 12 Lead Documentation Only    Date/Time: 7/15/2022 5:30 AM  Performed by: Isabella Fair PA-C  Authorized by: Isabella Fair PA-C     Indications / Diagnosis:  Sob, cp  ECG reviewed by me, the ED Provider: yes    Patient location:  ED  Interpretation:     Interpretation: non-specific    Rate:     ECG rate:  82    ECG rate assessment: normal    Rhythm:     Rhythm: sinus rhythm    Ectopy:     Ectopy: none    QRS:     QRS axis:  Normal    QRS intervals:  Normal  Conduction:     Conduction: normal    ST segments:     ST segments:  Normal  T waves:     T waves: non-specific and inverted      Inverted:  V1  Comments:      No signs of ischemia or block                 ED Course             HEART Risk Score    Flowsheet Row Most Recent Value   Heart Score Risk Calculator    History 1 Filed at: 07/14/2022 2303   ECG 0 Filed at: 07/14/2022 2303   Age 1 Filed at: 07/14/2022 2303   Risk Factors 1 Filed at: 07/14/2022 2303   Troponin 0 Filed at: 07/14/2022 2303   HEART Score 3 Filed at: 07/14/2022 2303                        SBIRT 20yo+    Flowsheet Row Most Recent Value SBIRT (25 yo +)    In order to provide better care to our patients, we are screening all of our patients for alcohol and drug use  Would it be okay to ask you these screening questions? Yes Filed at: 07/14/2022 2018   Initial Alcohol Screen: US AUDIT-C     1  How often do you have a drink containing alcohol? 0 Filed at: 07/14/2022 2018   2  How many drinks containing alcohol do you have on a typical day you are drinking? 0 Filed at: 07/14/2022 2018   3b  FEMALE Any Age, or MALE 65+: How often do you have 4 or more drinks on one occassion? 0 Filed at: 07/14/2022 2018   Audit-C Score 0 Filed at: 07/14/2022 2018   KIMBERLEY: How many times in the past year have you    Used an illegal drug or used a prescription medication for non-medical reasons? Never Filed at: 07/14/2022 2018                    MDM  Number of Diagnoses or Management Options  Chest pain  Hypoxia  SOB (shortness of breath)  Diagnosis management comments: Patient is a 61-year-old female presents emergency department today with concern for chest pain and shortness of breath  Patient was recently seen in the emergency department with the same was discharged home with suspected COPD exacerbation and started steroids  Examination today was unremarkable, heart was regular rate and rhythm, lungs are clear to auscultation  Differential includes COPD, viral syndrome, CHF, ACS, PE  I do not believe this is a PE as earlier today patient had received a full PE workup including CTA which was negative  COVID testing was ordered due to concern for viral syndrome and was negative  Laboratory evaluation including CBC, CMP, magnesium, troponin, BNP were ordered MR generally not concerning with the exception of slight hypoglycemia at 160s  Chest x-ray and EKG also performed, chest x-ray showed no acute cardiopulmonary abnormalities, EKG showed normal sinus rhythm without signs of ischemia or block    Patient was given a DuoNeb and steroids for concern for COPD exacerbation  Patient also repeatedly would desat as low as 84% and was then placed on oxygen via nasal cannula  ABG was also ordered to determine O2 saturations and signs of any acidemia and showed a low oxygen saturation of 51 8 and a CO2 of 31, normal pH  Spoke with hospitalist,Libia JOHNSON, who agreed to admit patient for suspected COPD exacerbation  Patient agreed to admission at this time, she was informed of all of her laboratory testing, all patient's questions were answered, patient stable time of admission  Amount and/or Complexity of Data Reviewed  Clinical lab tests: ordered and reviewed  Tests in the radiology section of CPT®: ordered and reviewed  Independent visualization of images, tracings, or specimens: yes    Patient Progress  Patient progress: stable      Disposition  Final diagnoses:   SOB (shortness of breath)   Hypoxia   Chest pain     Time reflects when diagnosis was documented in both MDM as applicable and the Disposition within this note     Time User Action Codes Description Comment    7/14/2022 10:47 PM Ramírez Branden Add [R06 02] SOB (shortness of breath)     7/14/2022 10:47 PM Ramírez Branden Add [R09 02] Hypoxia     7/14/2022 10:47 PM Ramírez Branden Add [R07 9] Chest pain       ED Disposition     ED Disposition   Admit    Condition   Stable    Date/Time   Thu Jul 14, 2022 10:47 PM    Comment   Case was discussed with Hospitalist (ELIZABETH Reza) and the patient's admission status was agreed to be Admission Status: inpatient status to the service of Dr Areli Sweeney              Follow-up Information    None         Current Discharge Medication List      CONTINUE these medications which have NOT CHANGED    Details   cefuroxime (CEFTIN) 500 mg tablet Take 1 tablet (500 mg total) by mouth every 12 (twelve) hours for 7 days  Qty: 14 tablet, Refills: 0    Associated Diagnoses: UTI (urinary tract infection)      lisinopril (ZESTRIL) 2 5 mg tablet Take 2 5 mg by mouth daily methylPREDNISolone 4 MG tablet therapy pack Use as directed on package  Qty: 21 tablet, Refills: 0    Associated Diagnoses: Bronchitis      ondansetron (Zofran ODT) 4 mg disintegrating tablet Take 1 tablet (4 mg total) by mouth every 6 (six) hours as needed for nausea or vomiting  Qty: 5 tablet, Refills: 0    Associated Diagnoses: Nausea         STOP taking these medications       nitrofurantoin (MACROBID) 100 mg capsule Comments:   Reason for Stopping:               No discharge procedures on file      PDMP Review     None          ED Provider  Electronically Signed by           Leana Goldberg, PA-C  07/15/22 7909

## 2022-07-16 LAB
ALBUMIN SERPL BCP-MCNC: 4.1 G/DL (ref 3.5–5)
ANION GAP SERPL CALCULATED.3IONS-SCNC: 8 MMOL/L (ref 4–13)
BUN SERPL-MCNC: 18 MG/DL (ref 5–25)
CALCIUM SERPL-MCNC: 9.4 MG/DL (ref 8.4–10.2)
CHLORIDE SERPL-SCNC: 106 MMOL/L (ref 96–108)
CO2 SERPL-SCNC: 24 MMOL/L (ref 21–32)
CREAT SERPL-MCNC: 0.64 MG/DL (ref 0.6–1.3)
ERYTHROCYTE [DISTWIDTH] IN BLOOD BY AUTOMATED COUNT: 13.2 % (ref 11.6–15.1)
GFR SERPL CREATININE-BSD FRML MDRD: 100 ML/MIN/1.73SQ M
GLUCOSE SERPL-MCNC: 141 MG/DL (ref 65–140)
HCT VFR BLD AUTO: 41.8 % (ref 34.8–46.1)
HGB BLD-MCNC: 13.8 G/DL (ref 11.5–15.4)
MAGNESIUM SERPL-MCNC: 2.2 MG/DL (ref 1.9–2.7)
MCH RBC QN AUTO: 31.9 PG (ref 26.8–34.3)
MCHC RBC AUTO-ENTMCNC: 33 G/DL (ref 31.4–37.4)
MCV RBC AUTO: 97 FL (ref 82–98)
PHOSPHATE SERPL-MCNC: 3.8 MG/DL (ref 2.7–4.5)
PLATELET # BLD AUTO: 222 THOUSANDS/UL (ref 149–390)
PMV BLD AUTO: 11 FL (ref 8.9–12.7)
POTASSIUM SERPL-SCNC: 4.4 MMOL/L (ref 3.5–5.3)
RBC # BLD AUTO: 4.33 MILLION/UL (ref 3.81–5.12)
SODIUM SERPL-SCNC: 138 MMOL/L (ref 135–147)
WBC # BLD AUTO: 15.96 THOUSAND/UL (ref 4.31–10.16)

## 2022-07-16 PROCEDURE — 94640 AIRWAY INHALATION TREATMENT: CPT

## 2022-07-16 PROCEDURE — 80069 RENAL FUNCTION PANEL: CPT | Performed by: STUDENT IN AN ORGANIZED HEALTH CARE EDUCATION/TRAINING PROGRAM

## 2022-07-16 PROCEDURE — 85027 COMPLETE CBC AUTOMATED: CPT | Performed by: STUDENT IN AN ORGANIZED HEALTH CARE EDUCATION/TRAINING PROGRAM

## 2022-07-16 PROCEDURE — 83735 ASSAY OF MAGNESIUM: CPT | Performed by: STUDENT IN AN ORGANIZED HEALTH CARE EDUCATION/TRAINING PROGRAM

## 2022-07-16 PROCEDURE — 94760 N-INVAS EAR/PLS OXIMETRY 1: CPT

## 2022-07-16 PROCEDURE — 99232 SBSQ HOSP IP/OBS MODERATE 35: CPT | Performed by: STUDENT IN AN ORGANIZED HEALTH CARE EDUCATION/TRAINING PROGRAM

## 2022-07-16 RX ORDER — CEFTRIAXONE 1 G/50ML
1000 INJECTION, SOLUTION INTRAVENOUS EVERY 24 HOURS
Status: DISCONTINUED | OUTPATIENT
Start: 2022-07-16 | End: 2022-07-17 | Stop reason: HOSPADM

## 2022-07-16 RX ADMIN — PREDNISONE 40 MG: 20 TABLET ORAL at 08:32

## 2022-07-16 RX ADMIN — ACETAMINOPHEN 650 MG: 325 TABLET, FILM COATED ORAL at 17:02

## 2022-07-16 RX ADMIN — LEVALBUTEROL HYDROCHLORIDE 1.25 MG: 1.25 SOLUTION RESPIRATORY (INHALATION) at 07:28

## 2022-07-16 RX ADMIN — AZITHROMYCIN 500 MG: 500 TABLET, FILM COATED ORAL at 22:49

## 2022-07-16 RX ADMIN — LISINOPRIL 5 MG: 5 TABLET ORAL at 08:32

## 2022-07-16 RX ADMIN — ENOXAPARIN SODIUM 40 MG: 40 INJECTION SUBCUTANEOUS at 08:42

## 2022-07-16 RX ADMIN — GUAIFENESIN 1200 MG: 600 TABLET ORAL at 21:27

## 2022-07-16 RX ADMIN — GUAIFENESIN 1200 MG: 600 TABLET ORAL at 08:32

## 2022-07-16 RX ADMIN — LEVALBUTEROL HYDROCHLORIDE 1.25 MG: 1.25 SOLUTION RESPIRATORY (INHALATION) at 14:33

## 2022-07-16 RX ADMIN — CEFTRIAXONE 1000 MG: 1 INJECTION, SOLUTION INTRAVENOUS at 08:42

## 2022-07-16 RX ADMIN — BENZONATATE 100 MG: 100 CAPSULE ORAL at 17:05

## 2022-07-16 RX ADMIN — IPRATROPIUM BROMIDE 0.5 MG: 0.5 SOLUTION RESPIRATORY (INHALATION) at 14:33

## 2022-07-16 RX ADMIN — BENZONATATE 100 MG: 100 CAPSULE ORAL at 05:42

## 2022-07-16 RX ADMIN — NICOTINE 1 PATCH: 14 PATCH, EXTENDED RELEASE TRANSDERMAL at 08:35

## 2022-07-16 RX ADMIN — LEVALBUTEROL HYDROCHLORIDE 1.25 MG: 1.25 SOLUTION RESPIRATORY (INHALATION) at 19:48

## 2022-07-16 RX ADMIN — IPRATROPIUM BROMIDE 0.5 MG: 0.5 SOLUTION RESPIRATORY (INHALATION) at 07:28

## 2022-07-16 RX ADMIN — IPRATROPIUM BROMIDE 0.5 MG: 0.5 SOLUTION RESPIRATORY (INHALATION) at 19:47

## 2022-07-16 NOTE — PLAN OF CARE
Problem: Potential for Falls  Goal: Patient will remain free of falls  Description: INTERVENTIONS:  - Educate patient/family on patient safety including physical limitations  - Instruct patient to call for assistance with activity   - Consult OT/PT to assist with strengthening/mobility   - Keep Call bell within reach  - Keep bed low and locked with side rails adjusted as appropriate  - Keep care items and personal belongings within reach  - Initiate and maintain comfort rounds  Outcome: Progressing     Problem: CARDIOVASCULAR - ADULT  Goal: Maintains optimal cardiac output and hemodynamic stability  Description: INTERVENTIONS:  - Monitor I/O, vital signs and rhythm  - Monitor for S/S and trends of decreased cardiac output  - Administer and titrate ordered vasoactive medications to optimize hemodynamic stability  - Assess quality of pulses, skin color and temperature  - Assess for signs of decreased coronary artery perfusion  - Instruct patient to report change in severity of symptoms  Outcome: Progressing  Goal: Absence of cardiac dysrhythmias or at baseline rhythm  Description: INTERVENTIONS:  - Continuous cardiac monitoring, vital signs, obtain 12 lead EKG if ordered  - Administer antiarrhythmic and heart rate control medications as ordered  - Monitor electrolytes and administer replacement therapy as ordered  Outcome: Progressing     Problem: RESPIRATORY - ADULT  Goal: Achieves optimal ventilation and oxygenation  Description: INTERVENTIONS:  - Assess for changes in respiratory status  - Assess for changes in mentation and behavior  - Position to facilitate oxygenation and minimize respiratory effort  - Oxygen administered by appropriate delivery if ordered  - Initiate smoking cessation education as indicated  - Encourage broncho-pulmonary hygiene including cough, deep breathe, Incentive Spirometry  - Assess the need for suctioning and aspirate as needed  - Assess and instruct to report SOB or any respiratory difficulty  - Respiratory Therapy support as indicated  Outcome: Progressing

## 2022-07-16 NOTE — ASSESSMENT & PLAN NOTE
· C/o dysuria, urinary urgency  · UA noted for leukocyte esterase and bacteria  · Was on macrobid as an outpatient  · Repeat UA on admission still + for uti  · Will treat with ceftriaxone while inpatient  · Ceftriaxone day 1

## 2022-07-16 NOTE — ASSESSMENT & PLAN NOTE
CT scan on admission showing:     -Multiple hypodense lesions in the spleen, statistically most likely cysts or hemangiomata  Consider 3 month follow-up left upper quadrant ultrasound to ensure stability     -Probable left renal cyst which can also be confirmed with ultrasound  Renal US showin  Splenic lesions not visible by ultrasound but likely represent a benign etiology such as hemangiomas  In the absence of a known malignancy, these are statistically benign  2   Left renal cyst is not optimally visualized but has the appearance of a minimally complex cyst on the cine images  Recommend ultrasound follow-up in approximately 6 months  Discussed with patient, states no history of malignancy  Admits to having endometrial cancer which she underwent cone biopsy  Pt states she was told everything was good and did not need chemo

## 2022-07-16 NOTE — ASSESSMENT & PLAN NOTE
· Noted to be hypoxic in mid-80s on RA   Requiring between 2-4L NC   · Wean as able to maintain SpO2 > 88-90%   · CTA negative for PE   · CXR no consolidation   · Tx plan as below  · Respiratory protocol     · Improved to 1L NC today

## 2022-07-16 NOTE — PROGRESS NOTES
Yahaira U  68   Progress Note - Paulina Main 1965, 56 y o  female MRN: [id number]  Unit/Bed#: -01 Encounter: [id number]  Primary Care Provider: Shellie Allan MD   Date and time admitted to hospital: 2022  8:03 PM    * COPD exacerbation ([facility] )  Assessment & Plan  · C/o SOB, productive cough, R-sided chest pain x2 days  Denies sick contacts, fevers, chills  Was seen this a m  and d/c with prednisone/albuterol without improvement  Also with DuoNeb x2  Now with new oxygen requirement  Significant smoking hx  · Denies formal diagnosis  States some time ago was told she had COPD by her physician  · COVID negative   · CXR: no consolidation  · Will continue with Solu-Medrol Q 8 for 1 day and transition to prednisone 40 daily starting tomorrow for 4 more days to complete a 5 day course of steroids for COPD exacerbation  · Xopenex/atrovent nebs t i d   · Azithromycin x3 d  · Mucinex   · Obtain sputum culture  · Recommend PFTs at d/c       Acute respiratory failure with hypoxia ([facility] )  Assessment & Plan  · Noted to be hypoxic in mid-80s on RA  Requiring between 2-4L NC   · Wean as able to maintain SpO2 > 88-90%   · CTA negative for PE   · CXR no consolidation   · Tx plan as below  · Respiratory protocol     · Improved to 1L NC today    Acute cystitis  Assessment & Plan  · C/o dysuria, urinary urgency  · UA noted for leukocyte esterase and bacteria  · Was on macrobid as an outpatient  · Repeat UA on admission still + for uti  · Will treat with ceftriaxone while inpatient  · Ceftriaxone day 1  Abnormal finding on CT scan  Assessment & Plan  CT scan on admission showing:     -Multiple hypodense lesions in the spleen, statistically most likely cysts or hemangiomata  Consider 3 month follow-up left upper quadrant ultrasound to ensure stability     -Probable left renal cyst which can also be confirmed with ultrasound  Renal US showin    Splenic lesions not visible by ultrasound but likely represent a benign etiology such as hemangiomas  In the absence of a known malignancy, these are statistically benign  2   Left renal cyst is not optimally visualized but has the appearance of a minimally complex cyst on the cine images  Recommend ultrasound follow-up in approximately 6 months  Discussed with patient, states no history of malignancy  Admits to having endometrial cancer which she underwent cone biopsy  Pt states she was told everything was good and did not need chemo  Primary hypertension  Assessment & Plan  · Patient BP above goal  · Increase home lisinopril from 2 5mg to 5mg  · Blood pressure acceptable on lisinopril 5 mg    Acute solar dermatitis  Assessment & Plan  · Noted on face and b/l shoulders with erythema, warmth, no blistering  Reports significant amount of time in sun  · Counseled on SPF use   · Supportive care    Chest pain on breathing  Assessment & Plan  · Reports sharp, pleuritic chest pain x3 days  · Does not appears to be cardiac  · Likely costochondritis from COPD exacerbation w/ cough   · SERVANDO 0  · ECG without ischemic changes, troponin negative x3  · Will monitor on tele overnight   · Notify provider for worsening CP or SOB    Tobacco use  Assessment & Plan  · Smokes 1 5ppd   · Nicotine replacement  ·  on cessation     SIRS (systemic inflammatory response syndrome) (HCC)  Assessment & Plan  · POA with tachycardia (HR 95), tachypnea (RR 24)  · Afebrile, no leukocytosis, negative procal  No consolidation seen on CXR  · On azithromycin   · Trend WBCs/procal            VTE Pharmacologic Prophylaxis: VTE Score: 5 High Risk (Score >/= 5) - Pharmacological DVT Prophylaxis Ordered: enoxaparin (Lovenox)  Sequential Compression Devices Ordered  Patient Centered Rounds: I performed bedside rounds with nursing staff today    Discussions with Specialists or Other Care Team Provider: case management    Education and Discussions with Family / Patient: Patient declined call to   Time Spent for Care: 30 minutes  More than 50% of total time spent on counseling and coordination of care as described above  Current Length of Stay: 2 day(s)  Current Patient Status: Inpatient   Certification Statement: The patient will continue to require additional inpatient hospital stay due to copd exacerbation  Discharge Plan: Anticipate discharge in 24-48 hrs to home  Code Status: Level 1 - Full Code    Subjective:   Patient seen examined at bedside  No acute events overnight  Admits to still having shortness of breath and wheezing with intermittent cough  Denies fevers chills  Objective:     Vitals:   Temp (24hrs), Av °F (36 7 °C), Min:97 °F (36 1 °C), Max:98 5 °F (36 9 °C)    Temp:  [97 °F (36 1 °C)-98 5 °F (36 9 °C)] 98 3 °F (36 8 °C)  HR:  [54-87] 64  Resp:  [15-18] 17  BP: (114-152)/() 142/74  SpO2:  [94 %-96 %] 94 %  Body mass index is 26 95 kg/m²  Input and Output Summary (last 24 hours): Intake/Output Summary (Last 24 hours) at 2022 0950  Last data filed at 2022 0601  Gross per 24 hour   Intake 250 ml   Output --   Net 250 ml       Physical Exam:   Physical Exam  Vitals reviewed  HENT:      Head: Normocephalic and atraumatic  Right Ear: External ear normal       Left Ear: External ear normal       Nose: Nose normal       Mouth/Throat:      Mouth: Mucous membranes are moist       Pharynx: Oropharynx is clear  Eyes:      Extraocular Movements: Extraocular movements intact  Cardiovascular:      Rate and Rhythm: Normal rate and regular rhythm  Pulses: Normal pulses  Heart sounds: Normal heart sounds  Pulmonary:      Effort: Pulmonary effort is normal       Breath sounds: Wheezing present  Abdominal:      General: Abdomen is flat  Palpations: Abdomen is soft  Tenderness: There is no abdominal tenderness  Musculoskeletal:      Cervical back: Normal range of motion        Right lower leg: No edema  Left lower leg: No edema  Skin:     General: Skin is warm and dry  Neurological:      General: No focal deficit present  Mental Status: She is alert  Mental status is at baseline  Psychiatric:         Mood and Affect: Mood normal          Behavior: Behavior normal           Additional Data:     Labs:  Results from last 7 days   Lab Units 07/16/22  0458 07/15/22  0505   WBC Thousand/uL 15 96* 7 86   HEMOGLOBIN g/dL 13 8 13 9   HEMATOCRIT % 41 8 41 4   PLATELETS Thousands/uL 222 180   NEUTROS PCT %  --  94*   LYMPHS PCT %  --  5*   MONOS PCT %  --  1*   EOS PCT %  --  0     Results from last 7 days   Lab Units 07/16/22  0458 07/15/22  0505 07/14/22  2035   SODIUM mmol/L 138   < > 140   POTASSIUM mmol/L 4 4   < > 3 8   CHLORIDE mmol/L 106   < > 106   CO2 mmol/L 24   < > 23   BUN mg/dL 18   < > 11   CREATININE mg/dL 0 64   < > 0 78   ANION GAP mmol/L 8   < > 11   CALCIUM mg/dL 9 4   < > 9 7   ALBUMIN g/dL 4 1  --  4 5   TOTAL BILIRUBIN mg/dL  --   --  0 73   ALK PHOS U/L  --   --  55   ALT U/L  --   --  12   AST U/L  --   --  12*   GLUCOSE RANDOM mg/dL 141*   < > 165*    < > = values in this interval not displayed  Results from last 7 days   Lab Units 07/14/22  2038   POC GLUCOSE mg/dl 170*         Results from last 7 days   Lab Units 07/15/22  0505 07/14/22 2035   PROCALCITONIN ng/ml <0 05 <0 05       Lines/Drains:  Invasive Devices  Report    Peripheral Intravenous Line  Duration           Peripheral IV 07/14/22 Right Antecubital 1 day                      Imaging: No pertinent imaging reviewed      Recent Cultures (last 7 days):   Results from last 7 days   Lab Units 07/14/22  0254   URINE CULTURE  <10,000 cfu/ml        Last 24 Hours Medication List:   Current Facility-Administered Medications   Medication Dose Route Frequency Provider Last Rate    acetaminophen  650 mg Oral Q6H PRN Yumiko Lerma PA-C      azithromycin  500 mg Oral Q24H Yumiko Lerma PA-C  benzonatate  100 mg Oral TID PRN Drea Loya DO      cefTRIAXone  1,000 mg Intravenous Q24H Drea Loya, DO 1,000 mg (07/16/22 0842)    enoxaparin  40 mg Subcutaneous Daily Yumiko Rebolledo, PA-ERUM      guaiFENesin  1,200 mg Oral BID Drea Loya DO      ipratropium  0 5 mg Nebulization TID Von Maradiaga, PA-C      levalbuterol  1 25 mg Nebulization TID Von Maradiaga, PA-ERUM      lisinopril  5 mg Oral Daily Yumiko Rebolledo, PA-ERUM      nicotine  1 patch Transdermal Daily Yumiko Rebolledo, PA-ERUM      predniSONE  40 mg Oral Daily Drea Loya DO          Today, Patient Was Seen By: Aldo Choi DO    **Please Note: This note may have been constructed using a voice recognition system  **

## 2022-07-17 VITALS
OXYGEN SATURATION: 94 % | WEIGHT: 157 LBS | HEART RATE: 56 BPM | SYSTOLIC BLOOD PRESSURE: 169 MMHG | BODY MASS INDEX: 26.8 KG/M2 | DIASTOLIC BLOOD PRESSURE: 99 MMHG | HEIGHT: 64 IN | TEMPERATURE: 97 F | RESPIRATION RATE: 18 BRPM

## 2022-07-17 LAB
ALBUMIN SERPL BCP-MCNC: 3.8 G/DL (ref 3.5–5)
ANION GAP SERPL CALCULATED.3IONS-SCNC: 9 MMOL/L (ref 4–13)
BUN SERPL-MCNC: 15 MG/DL (ref 5–25)
CALCIUM SERPL-MCNC: 8.8 MG/DL (ref 8.4–10.2)
CHLORIDE SERPL-SCNC: 103 MMOL/L (ref 96–108)
CO2 SERPL-SCNC: 25 MMOL/L (ref 21–32)
CREAT SERPL-MCNC: 0.61 MG/DL (ref 0.6–1.3)
ERYTHROCYTE [DISTWIDTH] IN BLOOD BY AUTOMATED COUNT: 13.2 % (ref 11.6–15.1)
GFR SERPL CREATININE-BSD FRML MDRD: 102 ML/MIN/1.73SQ M
GLUCOSE SERPL-MCNC: 94 MG/DL (ref 65–140)
HCT VFR BLD AUTO: 40.8 % (ref 34.8–46.1)
HGB BLD-MCNC: 13.4 G/DL (ref 11.5–15.4)
MAGNESIUM SERPL-MCNC: 2.2 MG/DL (ref 1.9–2.7)
MCH RBC QN AUTO: 31.9 PG (ref 26.8–34.3)
MCHC RBC AUTO-ENTMCNC: 32.8 G/DL (ref 31.4–37.4)
MCV RBC AUTO: 97 FL (ref 82–98)
PHOSPHATE SERPL-MCNC: 3.5 MG/DL (ref 2.7–4.5)
PLATELET # BLD AUTO: 182 THOUSANDS/UL (ref 149–390)
PMV BLD AUTO: 10.8 FL (ref 8.9–12.7)
POTASSIUM SERPL-SCNC: 3.8 MMOL/L (ref 3.5–5.3)
RBC # BLD AUTO: 4.2 MILLION/UL (ref 3.81–5.12)
SODIUM SERPL-SCNC: 137 MMOL/L (ref 135–147)
WBC # BLD AUTO: 9.42 THOUSAND/UL (ref 4.31–10.16)

## 2022-07-17 PROCEDURE — 85027 COMPLETE CBC AUTOMATED: CPT | Performed by: STUDENT IN AN ORGANIZED HEALTH CARE EDUCATION/TRAINING PROGRAM

## 2022-07-17 PROCEDURE — 80069 RENAL FUNCTION PANEL: CPT | Performed by: STUDENT IN AN ORGANIZED HEALTH CARE EDUCATION/TRAINING PROGRAM

## 2022-07-17 PROCEDURE — 94640 AIRWAY INHALATION TREATMENT: CPT

## 2022-07-17 PROCEDURE — 99239 HOSP IP/OBS DSCHRG MGMT >30: CPT | Performed by: PHYSICIAN ASSISTANT

## 2022-07-17 PROCEDURE — 83735 ASSAY OF MAGNESIUM: CPT | Performed by: STUDENT IN AN ORGANIZED HEALTH CARE EDUCATION/TRAINING PROGRAM

## 2022-07-17 PROCEDURE — 94760 N-INVAS EAR/PLS OXIMETRY 1: CPT

## 2022-07-17 RX ORDER — BENZONATATE 100 MG/1
100 CAPSULE ORAL 3 TIMES DAILY PRN
Qty: 20 CAPSULE | Refills: 0 | Status: SHIPPED | OUTPATIENT
Start: 2022-07-17

## 2022-07-17 RX ORDER — LISINOPRIL 5 MG/1
5 TABLET ORAL DAILY
Qty: 30 TABLET | Refills: 0 | Status: SHIPPED | OUTPATIENT
Start: 2022-07-18 | End: 2022-08-17

## 2022-07-17 RX ORDER — PREDNISONE 20 MG/1
40 TABLET ORAL DAILY
Qty: 4 TABLET | Refills: 0 | Status: SHIPPED | OUTPATIENT
Start: 2022-07-18 | End: 2022-07-20

## 2022-07-17 RX ADMIN — ENOXAPARIN SODIUM 40 MG: 40 INJECTION SUBCUTANEOUS at 08:46

## 2022-07-17 RX ADMIN — PREDNISONE 40 MG: 20 TABLET ORAL at 08:45

## 2022-07-17 RX ADMIN — CEFTRIAXONE 1000 MG: 1 INJECTION, SOLUTION INTRAVENOUS at 08:46

## 2022-07-17 RX ADMIN — GUAIFENESIN 1200 MG: 600 TABLET ORAL at 08:45

## 2022-07-17 RX ADMIN — IPRATROPIUM BROMIDE 0.5 MG: 0.5 SOLUTION RESPIRATORY (INHALATION) at 07:31

## 2022-07-17 RX ADMIN — BENZONATATE 100 MG: 100 CAPSULE ORAL at 08:58

## 2022-07-17 RX ADMIN — NICOTINE 1 PATCH: 14 PATCH, EXTENDED RELEASE TRANSDERMAL at 08:47

## 2022-07-17 RX ADMIN — ACETAMINOPHEN 650 MG: 325 TABLET, FILM COATED ORAL at 08:58

## 2022-07-17 RX ADMIN — LEVALBUTEROL HYDROCHLORIDE 1.25 MG: 1.25 SOLUTION RESPIRATORY (INHALATION) at 07:31

## 2022-07-17 RX ADMIN — LISINOPRIL 5 MG: 5 TABLET ORAL at 08:45

## 2022-07-17 NOTE — ASSESSMENT & PLAN NOTE
· Currently without symptoms  · Urine culture with less than 10,000 colony-forming units of mixed contaminant  · Likely asymptomatic bacteriuria    · Discharge without antibiotics at this time

## 2022-07-17 NOTE — INCIDENTAL FINDINGS
The following findings require follow up:  Radiographic finding   Findin  Splenic lesions not visible by ultrasound but likely represent a benign etiology such as hemangiomas  In the absence of a known malignancy, these are statistically benign  2   Left renal cyst is not optimally visualized but has the appearance of a minimally complex cyst on the cine images  Recommend ultrasound follow-up in approximately 6 months     Follow up required: repeat US in 6 months    Follow up should be done within 6 month(s)    Please notify the following clinician to assist with the follow up:   Primary care physician

## 2022-07-17 NOTE — PLAN OF CARE
Problem: Potential for Falls  Goal: Patient will remain free of falls  Description: INTERVENTIONS:  - Educate patient/family on patient safety including physical limitations  - Instruct patient to call for assistance with activity   - Consult OT/PT to assist with strengthening/mobility   - Keep Call bell within reach  - Keep bed low and locked with side rails adjusted as appropriate  - Keep care items and personal belongings within reach  - Initiate and maintain comfort rounds  - Make Fall Risk Sign visible to staff  - Offer Toileting every 2 Hours, in advance of need  - Initiate/Maintain alarm  - Obtain necessary fall risk management equipment:   - Apply yellow socks and bracelet for high fall risk patients  - Consider moving patient to room near nurses station  Outcome: Progressing     Problem: CARDIOVASCULAR - ADULT  Goal: Maintains optimal cardiac output and hemodynamic stability  Description: INTERVENTIONS:  - Monitor I/O, vital signs and rhythm  - Monitor for S/S and trends of decreased cardiac output  - Administer and titrate ordered vasoactive medications to optimize hemodynamic stability  - Assess quality of pulses, skin color and temperature  - Assess for signs of decreased coronary artery perfusion  - Instruct patient to report change in severity of symptoms  Outcome: Progressing  Goal: Absence of cardiac dysrhythmias or at baseline rhythm  Description: INTERVENTIONS:  - Continuous cardiac monitoring, vital signs, obtain 12 lead EKG if ordered  - Administer antiarrhythmic and heart rate control medications as ordered  - Monitor electrolytes and administer replacement therapy as ordered  Outcome: Progressing     Problem: RESPIRATORY - ADULT  Goal: Achieves optimal ventilation and oxygenation  Description: INTERVENTIONS:  - Assess for changes in respiratory status  - Assess for changes in mentation and behavior  - Position to facilitate oxygenation and minimize respiratory effort  - Oxygen administered by appropriate delivery if ordered  - Initiate smoking cessation education as indicated  - Encourage broncho-pulmonary hygiene including cough, deep breathe, Incentive Spirometry  - Assess the need for suctioning and aspirate as needed  - Assess and instruct to report SOB or any respiratory difficulty  - Respiratory Therapy support as indicated  Outcome: Progressing

## 2022-07-17 NOTE — ASSESSMENT & PLAN NOTE
· Noted with hypodense lesions of the spleen  · Ultrasound showing likely benign etiology  · Recommending outpatient follow-up in 6 months with a repeat ultrasound for surveillance    · Recommended on discharge  · Incidental findings reported

## 2022-07-17 NOTE — ASSESSMENT & PLAN NOTE
· POA with tachycardia (HR 95), tachypnea (RR 24)  · Afebrile, no leukocytosis, negative procal  No consolidation seen on CXR  · White blood cell count is normalized procalcitonin negative x2  · Discontinue azithromycin on discharge    · Outpatient follow-up with PCP  · SIRS likely in the setting of COPD exacerbation

## 2022-07-17 NOTE — ASSESSMENT & PLAN NOTE
· Reports sharp, pleuritic chest pain x3 days  · Does not appears to be cardiac  · Likely costochondritis from COPD exacerbation w/ cough   · Cardiac workup including EKG, and troponins negative  · SERVANDO score of 0  · Stable for discharge at this time with outpatient follow-up with PCP    · Patient instructed on over-the-counter anti-inflammatories to help with pleuritic chest pain

## 2022-07-17 NOTE — ASSESSMENT & PLAN NOTE
· C/o SOB, productive cough, R-sided chest pain x2 days  Denies sick contacts, fevers, chills  Was seen this a m  and d/c with prednisone/albuterol without improvement  Also with DuoNeb x2  Now with new oxygen requirement  Significant smoking hx  · Denies formal diagnosis  States some time ago was told she had COPD by her physician  · COVID negative   · CXR: no consolidation  · Patient transition to p o  Steroids  Will continue for another 2 days to complete 5 days total of steroids  · Counseled on importance of smoking cessation as this is likely exacerbating patient's [place] cystic lung conditions  · Referral placed for pulmonology on discharge  · Can discontinue azithromycin at this time due to patient improvement  · Discharge without oxygen    · Follow-up with primary care physician

## 2022-07-17 NOTE — ASSESSMENT & PLAN NOTE
· Lisinopril increased from 2 5-5 mg yesterday  · Blood pressure is still mildly elevated at 777 systolic  · This should not inhibitor for discharge, will discharge with 5 mg of lisinopril daily  · Instructed to take blood pressure 3 times a day and follow-up with primary care physician for titration of blood pressure medications

## 2022-07-17 NOTE — DISCHARGE SUMMARY
[address] 47  Discharge- Anaya Willamstler 1965, 56 y o  female MRN: [id number]  Unit/Bed#: -01 Encounter: [id number]  Primary Care Provider: Marlin Buck MD   Date and time admitted to hospital: 7/14/2022  8:03 PM    * COPD exacerbation ([facility] [address] )  Assessment & Plan  · C/o SOB, productive cough, R-sided chest pain x2 days  Denies sick contacts, fevers, chills  Was seen this a m  and d/c with prednisone/albuterol without improvement  Also with DuoNeb x2  Now with new oxygen requirement  Significant smoking hx  · Denies formal diagnosis  States some time ago was told she had COPD by her physician  · COVID negative   · CXR: no consolidation  · Patient transition to p o  Steroids  Will continue for another 2 days to complete 5 days total of steroids  · Counseled on importance of smoking cessation as this is likely exacerbating patient's Arctic cystic lung conditions  · Referral placed for pulmonology on discharge  · Can discontinue azithromycin at this time due to patient improvement  · Discharge without oxygen  · Follow-up with primary care physician      Chest pain on breathing  Assessment & Plan  · Reports sharp, pleuritic chest pain x3 days  · Does not appears to be cardiac  · Likely costochondritis from COPD exacerbation w/ cough   · Cardiac workup including EKG, and troponins negative  · SERVANDO score of 0  · Stable for discharge at this time with outpatient follow-up with PCP  · Patient instructed on over-the-counter anti-inflammatories to help with pleuritic chest pain    Acute respiratory failure with hypoxia (HCC)  Assessment & Plan  · Initially was hypoxic requiring 2-4 L to maintain normal oxygen saturations  · Currently is off oxygen and is no longer requiring any support  · Likely in the setting of COPD exacerbation  · Outpatient follow-up with PCP, and pulmonology    · Referral made for pulmonology on discharge    Abnormal finding on CT scan  Assessment & Plan  · Noted with hypodense lesions of the spleen  · Ultrasound showing likely benign etiology  · Recommending outpatient follow-up in 6 months with a repeat ultrasound for surveillance  · Recommended on discharge  · Incidental findings reported    SIRS (systemic inflammatory response syndrome) (HCC)  Assessment & Plan  · POA with tachycardia (HR 95), tachypnea (RR 24)  · Afebrile, no leukocytosis, negative procal  No consolidation seen on CXR  · White blood cell count is normalized procalcitonin negative x2  · Discontinue azithromycin on discharge  · Outpatient follow-up with PCP  · SIRS likely in the setting of COPD exacerbation      Acute cystitis  Assessment & Plan  · Currently without symptoms  · Urine culture with less than 10,000 colony-forming units of mixed contaminant  · Likely asymptomatic bacteriuria  · Discharge without antibiotics at this time    Acute solar dermatitis  Assessment & Plan  · Noted on face and b/l shoulders with erythema, warmth, no blistering  Reports significant amount of time in sun  · Counseled on SPF use       Primary hypertension  Assessment & Plan  · Lisinopril increased from 2 5-5 mg yesterday  · Blood pressure is still mildly elevated at 200 systolic  · This should not inhibitor for discharge, will discharge with 5 mg of lisinopril daily  · Instructed to take blood pressure 3 times a day and follow-up with primary care physician for titration of blood pressure medications  Tobacco use  Assessment & Plan  · Counseled on cessation  · Discussed with patient on smoking is affecting COPD        Medical Problems             Resolved Problems  Date Reviewed: 7/16/2022   None               Discharging Physician / Practitioner: Padilla Hansen PA-C  PCP: Ciaran Mackey MD  Admission Date:   Admission Orders (From admission, onward)     Ordered        07/14/22 2248  1 [facility]  Once                      Discharge Date: 07/17/22    Consultations During Hospital Stay:  · None    Procedures Performed:   XR chest 1 view portable    Result Date: 7/15/2022  Narrative: CHEST INDICATION:   chest pain , sob  COMPARISON:  Chest radiograph and CT July 14, 2022 EXAM PERFORMED/VIEWS:  XR CHEST PORTABLE FINDINGS: Cardiomediastinal silhouette appears unremarkable  Minimal bibasilar linear atelectasis  No focal consolidation  No pneumothorax or pleural effusion  Osseous structures appear within normal limits for patient age  Impression: No acute cardiopulmonary disease  Workstation performed: [id number]     XR chest 1 view portable    Result Date: 7/14/2022  Narrative: CHEST INDICATION:   chest pain/SOB  COMPARISON:  None EXAM PERFORMED/VIEWS:  XR CHEST PORTABLE 1 image FINDINGS: Cardiomediastinal silhouette appears unremarkable  Minimal atelectasis or scar at the left lung base  No infiltrate  No pneumothorax or pleural effusion  Osseous structures appear within normal limits for patient age  Impression: No acute cardiopulmonary disease  Workstation performed: [id number]     PE Study with CT Abdomen and Pelvis with contrast    Result Date: 7/14/2022  Narrative: CT PULMONARY ANGIOGRAM OF THE CHEST AND CT ABDOMEN AND PELVIS WITH INTRAVENOUS CONTRAST INDICATION:   chest pain  Dysuria  COMPARISON:  No prior chest CT for comparison  TECHNIQUE:  CT examination of the chest, abdomen and pelvis was performed  Thin section CT angiographic technique was used in the chest in order to evaluate for pulmonary embolus and coronal 3D MIP postprocessing was performed on the acquisition scanner  Axial, sagittal, and coronal 2D reformatted images were created from the source data and submitted for interpretation  Radiation dose length product (DLP) for this visit:  691 mGy-cm     This examination, like all CT scans performed in the [facility], was performed utilizing techniques to minimize radiation dose exposure, including the use of iterative reconstruction and automated exposure control  IV Contrast:  70 mL of iohexol (OMNIPAQUE) Enteric Contrast:  Enteric contrast was not administered  FINDINGS: CHEST PULMONARY ARTERIAL TREE:  No pulmonary embolus is seen  LUNGS:  There is bibasilar atelectasis  There are tracheal secretions  PLEURA:  Unremarkable  HEART/AORTA:  Heart is unremarkable for patient's age  No thoracic aortic aneurysm  MEDIASTINUM AND TIFFANIE:  Unremarkable  CHEST WALL AND LOWER NECK:  There is a right-sided thyroid nodule though this was also present on a CT of the cervical spine from 2013  ABDOMEN LIVER/BILIARY TREE:  Unremarkable  GALLBLADDER:  Gallbladder is surgically absent  SPLEEN:  There are multiple hypodense lesions throughout the spleen measuring up to 1 cm  PANCREAS:  Unremarkable  ADRENAL GLANDS:  Unremarkable  KIDNEYS/URETERS:  There is a probable small left renal cyst   No hydronephrosis  STOMACH AND BOWEL:  Unremarkable  APPENDIX:  No findings to suggest appendicitis  ABDOMINOPELVIC CAVITY:  No ascites  No pneumoperitoneum  No lymphadenopathy  VESSELS:  Unremarkable for patient's age  PELVIS REPRODUCTIVE ORGANS:  There are multiple fibroids including a left-sided subserosal fibroid measuring 3 8 x 3 1 cm and a lower uterine segment fibroid measuring 3 1 x 3 4 cm  URINARY BLADDER:  Unremarkable  ABDOMINAL WALL/INGUINAL REGIONS:  Unremarkable  OSSEOUS STRUCTURES:  No acute fracture or destructive osseous lesion  Impression: 1  No acute pulmonary embolism  2   No acute abnormality in the abdomen or pelvis  3   Multiple hypodense lesions in the spleen, statistically most likely cysts or hemangiomata  Consider 3 month follow-up left upper quadrant ultrasound to ensure stability  4   Probable left renal cyst which can also be confirmed with ultrasound  5   Fibroid uterus   Workstation performed: Eleanor Villegas left upper quadrant    Result Date: 7/15/2022  Narrative: LEFT UPPER QUADRANT ULTRASOUND INDICATION: Please evaluate for questionable left renal cyst on CT abdomen  COMPARISON:   None TECHNIQUE:   Real-time ultrasound of the abdomen was performed with a curvilinear transducer with both volumetric sweeps and still imaging techniques  FINDINGS: SPLEEN:  12 3 x 11 7 x 4 1 cm  Volume:310 5 Normal in size and contour  No mass  No perisplenic collections  The lesions in the spleen were not visualized due to shadowing from adjacent bowel gas, but is almost certainly benign in the absence of a known malignancy  LEFT KIDNEY:  11 4 x 6 6 x 5 1 cm  Volume: 201 1 ML Normal caliber and echogenicity  No hydronephrosis  No nephrolithiasis  No solid mass lesions  The probable left renal cyst is not well visualized due to adjacent bowel gas  But on the cine images it is visualized and has the appearance of a cyst with a few internal septations    ABDOMINAL CAVITY: No ascites  No loculated collections  · Impression: 1  Splenic lesions not visible by ultrasound but likely represent a benign etiology such as hemangiomas  In the absence of a known malignancy, these are statistically benign  2   Left renal cyst is not optimally visualized but has the appearance of a minimally complex cyst on the cine images  Recommend ultrasound follow-up in approximately 6 months  The study was marked in [facility] for immediate notification  Workstation performed: [id number]   ·     Significant Findings / Test Results:   · As noted above    Incidental Findings:   · Splenic lesions likely to resent benign etiology such as hemangiomas  Recommend outpatient follow-up with ultrasound in 6 months     Test Results Pending at Discharge (will require follow up): · None      Outpatient Tests Requested:  · None     Complications:  None     Reason for Admission:  COPD exacerbation    Hospital Course:   Abhi Davis is a 56 y o  female patient who originally presented to the hospital on 7/14/2022 due to COPD exacerbation    Patient initially admitted COPD exacerbation and was started on multimodal treatment approach including IV Solu-Medrol q 8 hours, azithromycin, Xopenex, and Atrovent inhalers, and respiratory protocol  She did have infectious workup including chest x-ray, and procalcitonin is done which were negative  Patient initially presented with hypoxia requiring 2-4 L to maintain oxygen saturations  This was quickly down titrated with treatment as above  She was also started on azithromycin for help with anti-inflammatory effects within the lungs  This will be discontinued on discharge  Patient was noted to be relatively hypertensive with systolic blood pressures ranging clubbing as high as 020 systolic  Her lisinopril dose was increased from 2 5 mg to 5 mg daily  Still with mild hypertension on discharge  Instructed patient to take her blood pressure 3 times a day and follow-up with her primary doctor regarding titration of her antihypertensives  Patient was noted to have multiple hypodense lesions in the spleen likely related to cysts versus hemangiomas, or other benign etiology  She did have ultrasound done of these lesions confirming this  She is to follow up outpatient in 6 months for repeat ultrasound  Discussed following up with pulmonology with the patient as an outpatient she does not follow up with lung doctor at all  Will place referral on discharge  For further hospital course, please see attached notes  Patient stable for discharge at this time will get ride from family member  Please see above list of diagnoses and related plan for additional information  Condition at Discharge: good    Discharge Day Visit / Exam:   Subjective:  She reports feeling well today  Improvement in her breathing  She is still having pleuritic chest pain which is tender to palpation    Vitals: Blood Pressure: 169/99 (07/17/22 0713)  Pulse: 56 (07/17/22 0713)  Temperature: (!) 97 °F (36 1 °C) (07/17/22 0713)  Temp Source: Tympanic (07/17/22 0713)  Respirations: 18 (07/17/22 0713)  Height: 5' 4" (162 6 cm) (07/14/22 2300)  Weight - Scale: 71 2 kg (157 lb) (07/14/22 2300)  SpO2: 94 % (07/17/22 0733)  Exam:   Physical Exam  Vitals and nursing note reviewed  Constitutional:       General: She is not in acute distress  Appearance: She is not ill-appearing  HENT:      Head: Normocephalic and atraumatic  Mouth/Throat:      Mouth: Mucous membranes are moist       Pharynx: Oropharynx is clear  Eyes:      General:         Right eye: No discharge  Left eye: No discharge  Cardiovascular:      Rate and Rhythm: Normal rate and regular rhythm  Heart sounds: Normal heart sounds  No murmur heard  Comments: Tenderness to palpation of sternal border  Pulmonary:      Effort: Pulmonary effort is normal       Breath sounds: Normal breath sounds  Abdominal:      General: Abdomen is flat  Palpations: Abdomen is soft  Musculoskeletal:      Right lower leg: No edema  Left lower leg: No edema  Skin:     General: Skin is warm and dry  Capillary Refill: Capillary refill takes less than 2 seconds  Neurological:      General: No focal deficit present  Mental Status: She is alert and oriented to person, place, and time  Psychiatric:         Mood and Affect: Mood normal          Discussion with Family: Patient declined call to   Discharge instructions/Information to patient and family:   See after visit summary for information provided to patient and family  Provisions for Follow-Up Care:  See after visit summary for information related to follow-up care and any pertinent home health orders  Disposition:   Home    Planned Readmission: none      Discharge Statement:  I spent 60 minutes discharging the patient  This time was spent on the day of discharge  I had direct contact with the patient on the day of discharge   Greater than 50% of the total time was spent examining patient, answering all patient questions, arranging and discussing plan of care with patient as well as directly providing post-discharge instructions  Additional time then spent on discharge activities  Discharge Medications:  See after visit summary for reconciled discharge medications provided to patient and/or family        **Please Note: This note may have been constructed using a voice recognition system**

## 2022-07-17 NOTE — ASSESSMENT & PLAN NOTE
· Noted on face and b/l shoulders with erythema, warmth, no blistering   Reports significant amount of time in sun  · Counseled on SPF use

## 2022-07-17 NOTE — DISCHARGE INSTR - AVS FIRST PAGE
Please continue to take prednisone 40 mg for the next 2 days to complete 5 days total of corticosteroids for COPD exacerbation  You likely have costochondritis exacerbated by coughing from COPD exacerbation  This is inflammation of the cartilage around the ribcage  You can take over-the-counter anti-inflammatories, and alternate with Tylenol for pain  If her pain does not improve, please follow-up with your primary care physician  Return to the emergency department for worsening shortness of breath, cough, fevers, lightheadedness, dizziness, or severe chest pain  Please start taking lisinopril 5 mg instead of 2 5 mg  This is for your blood pressure which was elevated while you were here  Please take your blood pressure 3 times a day and follow-up with your primary care doctor for adjustment in your hypertensive medications

## 2022-07-17 NOTE — ASSESSMENT & PLAN NOTE
· Initially was hypoxic requiring 2-4 L to maintain normal oxygen saturations  · Currently is off oxygen and is no longer requiring any support  · Likely in the setting of COPD exacerbation  · Outpatient follow-up with PCP, and pulmonology    · Referral made for pulmonology on discharge

## 2022-07-18 NOTE — UTILIZATION REVIEW
Notification of Discharge   This is a Notification of Discharge from our facility [address]  Please be advised that this patient has been discharge from our facility  Below you will find the admission and discharge date and time including the patients disposition  UTILIZATION REVIEW CONTACT:  [address]  Utilization   Network Utilization Review Department  Phone: [phone] x carefully listen to the prompts  All voicemails are confidential   Email: [email or web address]     PHYSICIAN ADVISORY SERVICES:  FOR [id number] REVIEW - MEDICAL NECESSITY DENIAL  Phone: [phone]  Fax: [phone]  Email: [email or web address]     PRESENTATION DATE: 7/14/2022  8:03 PM  OBERVATION ADMISSION DATE:   INPATIENT ADMISSION DATE: 7/14/22 10:48 PM   DISCHARGE DATE: 7/17/2022 12:22 PM  DISPOSITION: Home/Self Care Home/Self Care      IMPORTANT INFORMATION:  Send all requests for admission clinical reviews, approved or denied determinations and any other requests to dedicated fax number below belonging to the campus where the patient is receiving treatment   List of dedicated fax numbers:  [address] DENIALS (Administrative/Medical Necessity) [phone]   [address] (Maternity/NICU/Pediatrics) [phone]   Dominic Pollard [phone]   [address] [facility] [phone]   [address] [place] [address] [phone]   [address] [place] [address] [address] [place] [address],[address] [phone]   [facility]  [phone]   [address] [place] [address], [facility]  [address] [facility] [address] [facility] [phone]

## 2022-10-11 PROBLEM — N30.00 ACUTE CYSTITIS: Status: RESOLVED | Noted: 2022-07-14 | Resolved: 2022-10-11

## 2024-06-02 ENCOUNTER — HOSPITAL ENCOUNTER (EMERGENCY)
Facility: HOSPITAL | Age: 59
Discharge: HOME | End: 2024-06-03
Attending: EMERGENCY MEDICINE
Payer: MEDICARE

## 2024-06-02 DIAGNOSIS — R07.9 CHEST PAIN IN ADULT: Primary | ICD-10-CM

## 2024-06-02 DIAGNOSIS — E87.6 HYPOKALEMIA: ICD-10-CM

## 2024-06-02 PROCEDURE — 99285 EMERGENCY DEPT VISIT HI MDM: CPT | Performed by: EMERGENCY MEDICINE

## 2024-06-02 PROCEDURE — 93005 ELECTROCARDIOGRAM TRACING: CPT

## 2024-06-02 PROCEDURE — 99285 EMERGENCY DEPT VISIT HI MDM: CPT

## 2024-06-02 RX ORDER — KETOROLAC TROMETHAMINE 30 MG/ML
15 INJECTION, SOLUTION INTRAMUSCULAR; INTRAVENOUS ONCE
Status: COMPLETED | OUTPATIENT
Start: 2024-06-03 | End: 2024-06-03

## 2024-06-02 RX ORDER — ACETAMINOPHEN 325 MG/1
650 TABLET ORAL ONCE
Status: COMPLETED | OUTPATIENT
Start: 2024-06-03 | End: 2024-06-03

## 2024-06-03 ENCOUNTER — APPOINTMENT (EMERGENCY)
Dept: RADIOLOGY | Facility: HOSPITAL | Age: 59
End: 2024-06-03
Payer: MEDICARE

## 2024-06-03 ENCOUNTER — APPOINTMENT (EMERGENCY)
Dept: CT IMAGING | Facility: HOSPITAL | Age: 59
End: 2024-06-03
Payer: MEDICARE

## 2024-06-03 VITALS
OXYGEN SATURATION: 97 % | HEART RATE: 105 BPM | SYSTOLIC BLOOD PRESSURE: 91 MMHG | DIASTOLIC BLOOD PRESSURE: 65 MMHG | RESPIRATION RATE: 16 BRPM | TEMPERATURE: 97.6 F

## 2024-06-03 LAB
2HR DELTA HS TROPONIN: 1 NG/L
ALBUMIN SERPL BCP-MCNC: 4.5 G/DL (ref 3.5–5)
ALP SERPL-CCNC: 41 U/L (ref 34–104)
ALT SERPL W P-5'-P-CCNC: 16 U/L (ref 7–52)
ANION GAP SERPL CALCULATED.3IONS-SCNC: 14 MMOL/L (ref 4–13)
AST SERPL W P-5'-P-CCNC: 25 U/L (ref 13–39)
BASOPHILS # BLD AUTO: 0.05 THOUSANDS/ÂΜL (ref 0–0.1)
BASOPHILS NFR BLD AUTO: 1 % (ref 0–1)
BILIRUB SERPL-MCNC: 1.91 MG/DL (ref 0.2–1)
BNP SERPL-MCNC: 7 PG/ML (ref 0–100)
BUN SERPL-MCNC: 19 MG/DL (ref 5–25)
CALCIUM SERPL-MCNC: 9.6 MG/DL (ref 8.4–10.2)
CARDIAC TROPONIN I PNL SERPL HS: 3 NG/L
CARDIAC TROPONIN I PNL SERPL HS: 4 NG/L
CHLORIDE SERPL-SCNC: 97 MMOL/L (ref 96–108)
CO2 SERPL-SCNC: 27 MMOL/L (ref 21–32)
CREAT SERPL-MCNC: 0.82 MG/DL (ref 0.6–1.3)
D DIMER PPP FEU-MCNC: 1.19 UG/ML FEU
EOSINOPHIL # BLD AUTO: 0.13 THOUSAND/ÂΜL (ref 0–0.61)
EOSINOPHIL NFR BLD AUTO: 2 % (ref 0–6)
ERYTHROCYTE [DISTWIDTH] IN BLOOD BY AUTOMATED COUNT: 12.2 % (ref 11.6–15.1)
GFR SERPL CREATININE-BSD FRML MDRD: 79 ML/MIN/1.73SQ M
GLUCOSE SERPL-MCNC: 121 MG/DL (ref 65–140)
HCT VFR BLD AUTO: 39.1 % (ref 34.8–46.1)
HGB BLD-MCNC: 13.5 G/DL (ref 11.5–15.4)
IMM GRANULOCYTES # BLD AUTO: 0.02 THOUSAND/UL (ref 0–0.2)
IMM GRANULOCYTES NFR BLD AUTO: 0 % (ref 0–2)
LYMPHOCYTES # BLD AUTO: 2.42 THOUSANDS/ÂΜL (ref 0.6–4.47)
LYMPHOCYTES NFR BLD AUTO: 28 % (ref 14–44)
MAGNESIUM SERPL-MCNC: 1.9 MG/DL (ref 1.9–2.7)
MCH RBC QN AUTO: 31.4 PG (ref 26.8–34.3)
MCHC RBC AUTO-ENTMCNC: 34.5 G/DL (ref 31.4–37.4)
MCV RBC AUTO: 91 FL (ref 82–98)
MONOCYTES # BLD AUTO: 0.92 THOUSAND/ÂΜL (ref 0.17–1.22)
MONOCYTES NFR BLD AUTO: 11 % (ref 4–12)
NEUTROPHILS # BLD AUTO: 5.23 THOUSANDS/ÂΜL (ref 1.85–7.62)
NEUTS SEG NFR BLD AUTO: 58 % (ref 43–75)
NRBC BLD AUTO-RTO: 0 /100 WBCS
PLATELET # BLD AUTO: 239 THOUSANDS/UL (ref 149–390)
PMV BLD AUTO: 10.5 FL (ref 8.9–12.7)
POTASSIUM SERPL-SCNC: 2.8 MMOL/L (ref 3.5–5.3)
PROT SERPL-MCNC: 7.1 G/DL (ref 6.4–8.4)
RBC # BLD AUTO: 4.3 MILLION/UL (ref 3.81–5.12)
SODIUM SERPL-SCNC: 138 MMOL/L (ref 135–147)
WBC # BLD AUTO: 8.77 THOUSAND/UL (ref 4.31–10.16)

## 2024-06-03 PROCEDURE — 96361 HYDRATE IV INFUSION ADD-ON: CPT

## 2024-06-03 PROCEDURE — 83880 ASSAY OF NATRIURETIC PEPTIDE: CPT | Performed by: EMERGENCY MEDICINE

## 2024-06-03 PROCEDURE — 83735 ASSAY OF MAGNESIUM: CPT | Performed by: EMERGENCY MEDICINE

## 2024-06-03 PROCEDURE — 85379 FIBRIN DEGRADATION QUANT: CPT | Performed by: EMERGENCY MEDICINE

## 2024-06-03 PROCEDURE — 84484 ASSAY OF TROPONIN QUANT: CPT | Performed by: EMERGENCY MEDICINE

## 2024-06-03 PROCEDURE — 96374 THER/PROPH/DIAG INJ IV PUSH: CPT

## 2024-06-03 PROCEDURE — 71275 CT ANGIOGRAPHY CHEST: CPT

## 2024-06-03 PROCEDURE — 71045 X-RAY EXAM CHEST 1 VIEW: CPT

## 2024-06-03 PROCEDURE — 85025 COMPLETE CBC W/AUTO DIFF WBC: CPT | Performed by: EMERGENCY MEDICINE

## 2024-06-03 PROCEDURE — 80053 COMPREHEN METABOLIC PANEL: CPT | Performed by: EMERGENCY MEDICINE

## 2024-06-03 PROCEDURE — 36415 COLL VENOUS BLD VENIPUNCTURE: CPT | Performed by: EMERGENCY MEDICINE

## 2024-06-03 RX ORDER — POTASSIUM CHLORIDE 20 MEQ/1
20 TABLET, EXTENDED RELEASE ORAL ONCE
Status: COMPLETED | OUTPATIENT
Start: 2024-06-03 | End: 2024-06-03

## 2024-06-03 RX ORDER — POTASSIUM CHLORIDE 20 MEQ/1
40 TABLET, EXTENDED RELEASE ORAL ONCE
Status: COMPLETED | OUTPATIENT
Start: 2024-06-03 | End: 2024-06-03

## 2024-06-03 RX ADMIN — ACETAMINOPHEN 650 MG: 325 TABLET, FILM COATED ORAL at 00:24

## 2024-06-03 RX ADMIN — IOHEXOL 100 ML: 350 INJECTION, SOLUTION INTRAVENOUS at 01:18

## 2024-06-03 RX ADMIN — POTASSIUM CHLORIDE 20 MEQ: 1500 TABLET, EXTENDED RELEASE ORAL at 02:49

## 2024-06-03 RX ADMIN — KETOROLAC TROMETHAMINE 15 MG: 30 INJECTION, SOLUTION INTRAMUSCULAR at 00:24

## 2024-06-03 RX ADMIN — POTASSIUM CHLORIDE 40 MEQ: 1500 TABLET, EXTENDED RELEASE ORAL at 00:51

## 2024-06-03 RX ADMIN — SODIUM CHLORIDE 1000 ML: 0.9 INJECTION, SOLUTION INTRAVENOUS at 00:24

## 2024-06-03 NOTE — DISCHARGE INSTRUCTIONS
Follow up with your primary doctor/outpatient providers, and return to the emergency department for new or worsening symptoms.        CTA - CHEST WITH IV CONTRAST - PULMONARY ANGIOGRAM     INDICATION: chest pain, dyspnea.     COMPARISON: CTA chest, abdomen, and pelvis dated 7/14/2022     TECHNIQUE: CTA examination of the chest was performed using angiographic technique according to a protocol specifically tailored to evaluate for pulmonary embolism. Multiplanar 2D reformatted images were created from the source data. In addition, coronal   3D MIP postprocessing was performed on the acquisition scanner.     Radiation dose length product (DLP) for this visit: 299 mGy-cm . This examination, like all CT scans performed in the [facility], was performed utilizing techniques to minimize radiation dose exposure, including the use of iterative   reconstruction and automated exposure control.     IV Contrast: 100 mL of iohexol (OMNIPAQUE)     FINDINGS:     PULMONARY ARTERIAL TREE: No pulmonary embolus is seen.     LUNGS: Mild scattered pulmonary emphysematous changes. Mild atelectasis noted dependently in the bilateral lower lung fields. Lungs otherwise appear grossly clear.     PLEURA: Unremarkable.     HEART/GREAT VESSELS: Heart is unremarkable for patient's age. No thoracic aortic aneurysm.     MEDIASTINUM AND TIFFANIE: Unremarkable.     CHEST WALL AND LOWER NECK: Unremarkable.     VISUALIZED STRUCTURES IN THE UPPER ABDOMEN: Status post cholecystectomy. Subcentimeter low-density lesions in the spleen, too small to definitively characterize. Small hiatal hernia suspected. Otherwise grossly unremarkable.     OSSEOUS STRUCTURES: No acute fracture or destructive osseous lesion.        IMPRESSION:     No pulmonary embolism or acute pulmonary process is seen.     Scattered pulmonary emphysematous changes.     Small hiatal hernia suspected, other findings as above.

## 2024-06-03 NOTE — ED PROVIDER NOTES
History  Chief Complaint   Patient presents with    Chest Pain     Pt presents to the ED c/o chest pain and stating that she has not been feeling well for 3-4 days     Patient is a 59-year-old female seen in the emergency department with concern for strong, intermittent substernal chest pain over the past day.  Patient notes no definite clear exacerbating or alleviating factors for the pain.  Patient notes some occasional shortness of breath.  Patient notes chronic cough.  Patient notes no fever, nausea, vomiting, diarrhea, weakness, numbness, tingling.  Patient notes no radiation of the pain.        Prior to Admission Medications   Prescriptions Last Dose Informant Patient Reported? Taking?   benzonatate (TESSALON PERLES) 100 mg capsule   No No   Sig: Take 1 capsule (100 mg total) by mouth 3 (three) times a day as needed for cough   lisinopril (ZESTRIL) 5 mg tablet   No No   Sig: Take 1 tablet (5 mg total) by mouth daily   ondansetron (Zofran ODT) 4 mg disintegrating tablet   No No   Sig: Take 1 tablet (4 mg total) by mouth every 6 (six) hours as needed for nausea or vomiting      Facility-Administered Medications: None       Past Medical History:   Diagnosis Date    Hypertension     Psychiatric disorder        Past Surgical History:   Procedure Laterality Date    ABDOMINAL SURGERY      gallbladder removal       History reviewed. No pertinent family history.  I have reviewed and agree with the history as documented.    E-Cigarette/Vaping    E-Cigarette Use Never User      E-Cigarette/Vaping Substances     Social History     Tobacco Use    Smoking status: Every Day     Current packs/day: 0.50     Types: Cigarettes    Smokeless tobacco: Never   Vaping Use    Vaping status: Never Used   Substance Use Topics    Alcohol use: Not Currently    Drug use: Yes     Types: Marijuana     Comment: last used 07/07/22       Review of Systems   Constitutional:  Negative for chills and fever.   HENT:  Negative for ear pain and sore  throat.    Eyes:  Negative for pain and visual disturbance.   Respiratory:  Positive for cough and shortness of breath.    Cardiovascular:  Positive for chest pain. Negative for palpitations.   Gastrointestinal:  Negative for abdominal pain and vomiting.   Genitourinary:  Negative for decreased urine volume and difficulty urinating.   Musculoskeletal:  Negative for gait problem and neck stiffness.   Skin:  Negative for color change and rash.   Neurological:  Negative for seizures and syncope.   Psychiatric/Behavioral:  Negative for agitation and confusion.    All other systems reviewed and are negative.      Physical Exam  Physical Exam  Vitals and nursing note reviewed.   Constitutional:       General: She is not in acute distress.     Appearance: She is well-developed.   HENT:      Head: Normocephalic and atraumatic.      Right Ear: External ear normal.      Left Ear: External ear normal.      Nose: Nose normal.      Mouth/Throat:      Pharynx: Oropharynx is clear.   Eyes:      General: No scleral icterus.     Conjunctiva/sclera: Conjunctivae normal.   Cardiovascular:      Rate and Rhythm: Normal rate and regular rhythm.      Heart sounds: No murmur heard.  Pulmonary:      Effort: Pulmonary effort is normal. No respiratory distress.      Breath sounds: Normal breath sounds.   Abdominal:      General: There is no distension.      Palpations: Abdomen is soft.      Tenderness: There is no abdominal tenderness.   Musculoskeletal:         General: No deformity or signs of injury.      Cervical back: Normal range of motion and neck supple.   Skin:     General: Skin is warm and dry.   Neurological:      General: No focal deficit present.      Mental Status: She is alert.      Cranial Nerves: No cranial nerve deficit.      Sensory: No sensory deficit.   Psychiatric:         Mood and Affect: Mood normal.         Thought Content: Thought content normal.         Vital Signs  ED Triage Vitals   Temperature Pulse Respirations  Blood Pressure SpO2   06/02/24 2350 06/02/24 2350 06/02/24 2350 06/02/24 2350 06/02/24 2350   97.6 °F (36.4 °C) 88 16 106/66 97 %      Temp Source Heart Rate Source Patient Position - Orthostatic VS BP Location FiO2 (%)   06/02/24 2350 06/02/24 2350 06/02/24 2350 06/02/24 2350 --   Oral Monitor Sitting Left arm       Pain Score       06/03/24 0024       7           Vitals:    06/03/24 0030 06/03/24 0130 06/03/24 0200 06/03/24 0230   BP: 102/70 104/58 102/65 91/65   Pulse: 79 98 (!) 111 105   Patient Position - Orthostatic VS:  Lying Lying          Visual Acuity      ED Medications  Medications   potassium chloride (Klor-Con M20) CR tablet 20 mEq (has no administration in time range)   sodium chloride 0.9 % bolus 1,000 mL (0 mL Intravenous Stopped 6/3/24 0206)   ketorolac (TORADOL) injection 15 mg (15 mg Intravenous Given 6/3/24 0024)   acetaminophen (TYLENOL) tablet 650 mg (650 mg Oral Given 6/3/24 0024)   potassium chloride (Klor-Con M20) CR tablet 40 mEq (40 mEq Oral Given 6/3/24 0051)   iohexol (OMNIPAQUE) 350 MG/ML injection (SINGLE-DOSE) 100 mL (100 mL Intravenous Given 6/3/24 0118)       Diagnostic Studies  Results Reviewed       Procedure Component Value Units Date/Time    HS Troponin I 2hr [[id number]]  (Normal) Collected: 06/03/24 0208    Lab Status: Final result Specimen: Blood from Arm, Right Updated: 06/03/24 0237     hs TnI 2hr 4 ng/L      Delta 2hr hsTnI 1 ng/L     HS Troponin I 4hr [[id number]]     Lab Status: No result Specimen: Blood     B-Type Natriuretic Peptide(BNP) [[id number]]  (Normal) Collected: 06/03/24 0011    Lab Status: Final result Specimen: Blood from Arm, Right Updated: 06/03/24 0058     BNP 7 pg/mL     HS Troponin 0hr (reflex protocol) [[id number]]  (Normal) Collected: 06/03/24 0011    Lab Status: Final result Specimen: Blood from Arm, Right Updated: 06/03/24 0045     hs TnI 0hr 3 ng/L     Comprehensive metabolic panel [[id number]]  (Abnormal) Collected: 06/03/24 0011    Lab Status: Final  result Specimen: Blood from Arm, Right Updated: 06/03/24 0038     Sodium 138 mmol/L      Potassium 2.8 mmol/L      Chloride 97 mmol/L      CO2 27 mmol/L      ANION GAP 14 mmol/L      BUN 19 mg/dL      Creatinine 0.82 mg/dL      Glucose 121 mg/dL      Calcium 9.6 mg/dL      AST 25 U/L      ALT 16 U/L      Alkaline Phosphatase 41 U/L      Total Protein 7.1 g/dL      Albumin 4.5 g/dL      Total Bilirubin 1.91 mg/dL      eGFR 79 ml/min/1.73sq m     Narrative:      [employer] Kid[employer] guidelines for Chronic Kidney Disease (CKD):     Stage 1 with normal or high GFR (GFR > 90 mL/min/1.73 square meters)    Stage 2 Mild CKD (GFR = 60-89 mL/min/1.73 square meters)    Stage 3A Moderate CKD (GFR = 45-59 mL/min/1.73 square meters)    Stage 3B Moderate CKD (GFR = 30-44 mL/min/1.73 square meters)    Stage 4 Severe CKD (GFR = 15-29 mL/min/1.73 square meters)    Stage 5 End Stage CKD (GFR <15 mL/min/1.73 square meters)  Note: GFR calculation is accurate only with a steady state creatinine    Magnesium [[id number]]  (Normal) Collected: 06/03/24 0011    Lab Status: Final result Specimen: Blood from Arm, Right Updated: 06/03/24 0038     Magnesium 1.9 mg/dL     D-dimer, quantitative [[id number]]  (Abnormal) Collected: 06/03/24 0011    Lab Status: Final result Specimen: Blood from Arm, Right Updated: 06/03/24 0036     D-Dimer, Quant 1.19 ug/ml FEU     Narrative:      In the evaluation for possible pulmonary embolism, in the appropriate (Well's Score of 4 or less) patient, the age adjusted d-dimer cutoff for this patient can be calculated as:    Age x 0.01 (in ug/mL) for Age-adjusted D-dimer exclusion threshold for a patient over 50 years.    CBC and differential [[id number]] Collected: 06/03/24 0011    Lab Status: Final result Specimen: Blood from Arm, Right Updated: 06/03/24 0020     WBC 8.77 Thousand/uL      RBC 4.30 Million/uL      Hemoglobin 13.5 g/dL      Hematocrit 39.1 %      MCV 91 fL      MCH 31.4 pg      MCHC 34.5  g/dL      RDW 12.2 %      MPV 10.5 fL      Platelets 239 Thousands/uL      nRBC 0 /100 WBCs      Segmented % 58 %      Immature Grans % 0 %      Lymphocytes % 28 %      Monocytes % 11 %      Eosinophils Relative 2 %      Basophils Relative 1 %      Absolute Neutrophils 5.23 Thousands/µL      Absolute Immature Grans 0.02 Thousand/uL      Absolute Lymphocytes 2.42 Thousands/µL      Absolute Monocytes 0.92 Thousand/µL      Eosinophils Absolute 0.13 Thousand/µL      Basophils Absolute 0.05 Thousands/µL                    CTA ED chest PE Study   Final Result by Wojciech Adorno DO (06/03 0148)      No pulmonary embolism or acute pulmonary process is seen.      Scattered pulmonary emphysematous changes.      Small hiatal hernia suspected, other findings as above.         Workstation performed: [id number]         XR chest 1 view portable   ED Interpretation by Willi Camejo MD (06/03 0012)   No infiltrate or pneumothorax                 Procedures  ECG 12 Lead Documentation Only    Date/Time: 6/2/2024 11:57 PM    Performed by: Willi Camejo MD  Authorized by: Willi Camejo MD    Indications / Diagnosis:  Chest pain  ECG reviewed by me, the ED Provider: yes    Patient location:  ED  Rate:     ECG rate:  78    ECG rate assessment: normal    Rhythm:     Rhythm: sinus rhythm    QRS:     QRS axis:  Normal  ST segments:     ST segments:  Non-specific  Comments:      Normal sinus rhythm at 78, normal axis, , QRS 92, QTc 469, nonspecific ST-wave abnormality, no definite evidence of acute ischemia           ED Course  ED Course as of 06/03/24 0244   Mon Jun 03, 2024   0220 CTA chest-    IMPRESSION:     No pulmonary embolism or acute pulmonary process is seen.     Scattered pulmonary emphysematous changes.               HEART Risk Score      Flowsheet Row Most Recent Value   Heart Score Risk Calculator    History 1 Filed at: 06/03/2024 0050   ECG 1 Filed at: 06/03/2024 0050   Age 1 Filed at: 06/03/2024 0050    Risk Factors 1 Filed at: 06/03/2024 0050   Troponin 0 Filed at: 06/03/2024 0050   HEART Score 4 Filed at: 06/03/2024 0050                                        Medical Decision Making  Patient is a 59-year-old female seen in the emergency department with concern for chest pain.  EKG was obtained and noted.  Chest x-ray showed no infiltrate or pneumothorax.  Patient was treated with medication for symptom control, with good effect.  Laboratory evaluation remarkable for elevated D-dimer of 1.19, low potassium of 2.8, elevated anion gap of 14, elevated total bilirubin of 1.91, elevated D-dimer of 1.19, serial troponins of 3, 4. CTA chest was obtained to evaluate for pulmonary embolism.  CT showed no pulmonary embolism, but showed scattered pulmonary emphysematous changes, small hiatal hernia suspected. HEART score was reviewed, and admission is not indicated at this time.  No definite cause of the patient's symptoms was discovered. Evaluation is not consistent with ACS, PE, pneumothorax, or pneumonia.  Plan to have patient follow up with PCP/outpatient providers.  Patient stable for discharge home.  Discharge instructions were reviewed with patient.    Problems Addressed:  Chest pain in adult: acute illness or injury    Amount and/or Complexity of Data Reviewed  Labs: ordered. Decision-making details documented in ED Course.  Radiology: ordered and independent interpretation performed. Decision-making details documented in ED Course.  ECG/medicine tests: ordered and independent interpretation performed. Decision-making details documented in ED Course.    Risk  OTC drugs.  Prescription drug management.             Disposition  Final diagnoses:   Chest pain in adult   Hypokalemia     Time reflects when diagnosis was documented in both MDM as applicable and the Disposition within this note       Time User Action Codes Description Comment    6/2/2024 11:50 PM Willi Camejo Add [R07.9] Chest pain in adult     6/3/2024  12:47 AM Willi Camejo [E87.6] Hypokalemia           ED Disposition       ED Disposition   Discharge    Condition   Stable    Date/Time   Mon Peter 3, 2024 0238    Comment   Jennifer De La Vega discharge to home/self care.                   Follow-up Information       Follow up With Specialties Details Why Contact Info Additional Information    Cheryl Fox MD Family Medicine Call in 1 day  [address]  [address]  [facility] [address] [phone]       [facility] Call in 1 day  [address]  [address]  [facility] [address] [phone] [facility], [address], [place], [place], [address] [phone]            Patient's Medications   Discharge Prescriptions    No medications on file           PDMP Review       None            ED Provider  Electronically Signed by             Willi Camejo MD  06/03/24 2074

## 2024-06-04 ENCOUNTER — VBI (OUTPATIENT)
Dept: FAMILY MEDICINE CLINIC | Facility: CLINIC | Age: 59
End: 2024-06-04

## 2024-06-04 NOTE — TELEPHONE ENCOUNTER
06/04/24 2:04 PM    Patient contacted post ED visit, first outreach attempt made. Message was left for patient to return a call to the [facility] Department at [facility]: Phone [phone].  W mother no mc on file.    Thank you.  [facility]  [facility]

## 2024-06-05 NOTE — TELEPHONE ENCOUNTER
06/05/24 10:13 AM    Patient contacted post ED visit, phone outreaches were unsuccessful; patient does not have MyChart, a MyChart letter has not been sent.     Thank you.  [facility]cd[facility]  [facility]

## 2024-06-05 NOTE — TELEPHONE ENCOUNTER
06/05/24 10:10 AM    Patient contacted post ED visit, third outreach attempt made. Message was left for patient to return a call to either the [facility] Department at [facility]: Phone [phone] or the PCP office.     Thank you.  [facility]  [facility]

## 2024-06-05 NOTE — TELEPHONE ENCOUNTER
06/05/24 10:09 AM    Patient contacted post ED visit, second outreach attempt made. Message was left for patient to return a call to the [facility] Department at [facility]: Phone [phone].  Done on 6-4 at 204pm all circuits were busy.    Thank you.  [facility] Mcdowell  [facility]

## 2024-06-06 LAB
ATRIAL RATE: 78 BPM
P AXIS: 79 DEGREES
PR INTERVAL: 144 MS
QRS AXIS: 79 DEGREES
QRSD INTERVAL: 92 MS
QT INTERVAL: 412 MS
QTC INTERVAL: 469 MS
T WAVE AXIS: 62 DEGREES
VENTRICULAR RATE: 78 BPM

## 2024-06-06 PROCEDURE — 93010 ELECTROCARDIOGRAM REPORT: CPT | Performed by: INTERNAL MEDICINE

## 2024-06-27 ENCOUNTER — HOSPITAL ENCOUNTER (EMERGENCY)
Facility: HOSPITAL | Age: 59
Discharge: HOME | End: 2024-06-27
Attending: EMERGENCY MEDICINE
Payer: MEDICARE

## 2024-06-27 ENCOUNTER — APPOINTMENT (EMERGENCY)
Dept: CT IMAGING | Facility: HOSPITAL | Age: 59
End: 2024-06-27
Payer: MEDICARE

## 2024-06-27 VITALS
SYSTOLIC BLOOD PRESSURE: 111 MMHG | OXYGEN SATURATION: 95 % | DIASTOLIC BLOOD PRESSURE: 76 MMHG | HEART RATE: 85 BPM | TEMPERATURE: 97.5 F | RESPIRATION RATE: 20 BRPM

## 2024-06-27 DIAGNOSIS — S39.012A LUMBAR STRAIN, INITIAL ENCOUNTER: Primary | ICD-10-CM

## 2024-06-27 DIAGNOSIS — R10.9 ABDOMINAL PAIN: ICD-10-CM

## 2024-06-27 DIAGNOSIS — D25.9 FIBROID UTERUS: ICD-10-CM

## 2024-06-27 DIAGNOSIS — M43.16 ANTEROLISTHESIS OF LUMBAR SPINE: ICD-10-CM

## 2024-06-27 LAB
ALBUMIN SERPL BCG-MCNC: 4.5 G/DL (ref 3.5–5)
ALP SERPL-CCNC: 48 U/L (ref 34–104)
ALT SERPL W P-5'-P-CCNC: 24 U/L (ref 7–52)
ANION GAP SERPL CALCULATED.3IONS-SCNC: 10 MMOL/L (ref 4–13)
AST SERPL W P-5'-P-CCNC: 20 U/L (ref 13–39)
ATRIAL RATE: 66 BPM
BASOPHILS # BLD AUTO: 0.04 THOUSANDS/ÂΜL (ref 0–0.1)
BASOPHILS NFR BLD AUTO: 0 % (ref 0–1)
BILIRUB DIRECT SERPL-MCNC: 0.29 MG/DL (ref 0–0.2)
BILIRUB SERPL-MCNC: 1.65 MG/DL (ref 0.2–1)
BILIRUB UR QL STRIP: NEGATIVE
BUN SERPL-MCNC: 16 MG/DL (ref 5–25)
CALCIUM SERPL-MCNC: 9.6 MG/DL (ref 8.4–10.2)
CARDIAC TROPONIN I PNL SERPL HS: <2 NG/L
CHLORIDE SERPL-SCNC: 100 MMOL/L (ref 96–108)
CLARITY UR: CLEAR
CO2 SERPL-SCNC: 30 MMOL/L (ref 21–32)
COLOR UR: YELLOW
CREAT SERPL-MCNC: 0.8 MG/DL (ref 0.6–1.3)
EOSINOPHIL # BLD AUTO: 0.12 THOUSAND/ÂΜL (ref 0–0.61)
EOSINOPHIL NFR BLD AUTO: 1 % (ref 0–6)
ERYTHROCYTE [DISTWIDTH] IN BLOOD BY AUTOMATED COUNT: 12.7 % (ref 11.6–15.1)
GFR SERPL CREATININE-BSD FRML MDRD: 82 ML/MIN/1.73SQ M
GLUCOSE SERPL-MCNC: 95 MG/DL (ref 65–140)
GLUCOSE UR STRIP-MCNC: NEGATIVE MG/DL
HCT VFR BLD AUTO: 42.1 % (ref 34.8–46.1)
HGB BLD-MCNC: 14 G/DL (ref 11.5–15.4)
HGB UR QL STRIP.AUTO: NEGATIVE
IMM GRANULOCYTES # BLD AUTO: 0.03 THOUSAND/UL (ref 0–0.2)
IMM GRANULOCYTES NFR BLD AUTO: 0 % (ref 0–2)
KETONES UR STRIP-MCNC: NEGATIVE MG/DL
LEUKOCYTE ESTERASE UR QL STRIP: NEGATIVE
LIPASE SERPL-CCNC: 16 U/L (ref 11–82)
LYMPHOCYTES # BLD AUTO: 2.41 THOUSANDS/ÂΜL (ref 0.6–4.47)
LYMPHOCYTES NFR BLD AUTO: 24 % (ref 14–44)
MCH RBC QN AUTO: 31.3 PG (ref 26.8–34.3)
MCHC RBC AUTO-ENTMCNC: 33.3 G/DL (ref 31.4–37.4)
MCV RBC AUTO: 94 FL (ref 82–98)
MONOCYTES # BLD AUTO: 0.8 THOUSAND/ÂΜL (ref 0.17–1.22)
MONOCYTES NFR BLD AUTO: 8 % (ref 4–12)
NEUTROPHILS # BLD AUTO: 6.48 THOUSANDS/ÂΜL (ref 1.85–7.62)
NEUTS SEG NFR BLD AUTO: 67 % (ref 43–75)
NITRITE UR QL STRIP: NEGATIVE
NRBC BLD AUTO-RTO: 0 /100 WBCS
P AXIS: 61 DEGREES
PH UR STRIP.AUTO: 6.5 [PH]
PLATELET # BLD AUTO: 260 THOUSANDS/UL (ref 149–390)
PMV BLD AUTO: 9.5 FL (ref 8.9–12.7)
POTASSIUM SERPL-SCNC: 3.5 MMOL/L (ref 3.5–5.3)
PR INTERVAL: 128 MS
PROT SERPL-MCNC: 7.3 G/DL (ref 6.4–8.4)
PROT UR STRIP-MCNC: NEGATIVE MG/DL
QRS AXIS: 66 DEGREES
QRSD INTERVAL: 90 MS
QT INTERVAL: 438 MS
QTC INTERVAL: 459 MS
RBC # BLD AUTO: 4.47 MILLION/UL (ref 3.81–5.12)
SODIUM SERPL-SCNC: 140 MMOL/L (ref 135–147)
SP GR UR STRIP.AUTO: 1.01 (ref 1–1.03)
T WAVE AXIS: 63 DEGREES
UROBILINOGEN UR QL STRIP.AUTO: 0.2 E.U./DL
VENTRICULAR RATE: 66 BPM
WBC # BLD AUTO: 9.88 THOUSAND/UL (ref 4.31–10.16)

## 2024-06-27 PROCEDURE — 81003 URINALYSIS AUTO W/O SCOPE: CPT | Performed by: EMERGENCY MEDICINE

## 2024-06-27 PROCEDURE — 83690 ASSAY OF LIPASE: CPT | Performed by: EMERGENCY MEDICINE

## 2024-06-27 PROCEDURE — 93010 ELECTROCARDIOGRAM REPORT: CPT | Performed by: INTERNAL MEDICINE

## 2024-06-27 PROCEDURE — 80048 BASIC METABOLIC PNL TOTAL CA: CPT | Performed by: EMERGENCY MEDICINE

## 2024-06-27 PROCEDURE — 85025 COMPLETE CBC W/AUTO DIFF WBC: CPT | Performed by: EMERGENCY MEDICINE

## 2024-06-27 PROCEDURE — 36415 COLL VENOUS BLD VENIPUNCTURE: CPT | Performed by: EMERGENCY MEDICINE

## 2024-06-27 PROCEDURE — 99284 EMERGENCY DEPT VISIT MOD MDM: CPT | Performed by: EMERGENCY MEDICINE

## 2024-06-27 PROCEDURE — 93005 ELECTROCARDIOGRAM TRACING: CPT

## 2024-06-27 PROCEDURE — 84484 ASSAY OF TROPONIN QUANT: CPT | Performed by: EMERGENCY MEDICINE

## 2024-06-27 PROCEDURE — 99284 EMERGENCY DEPT VISIT MOD MDM: CPT

## 2024-06-27 PROCEDURE — 96374 THER/PROPH/DIAG INJ IV PUSH: CPT

## 2024-06-27 PROCEDURE — 80076 HEPATIC FUNCTION PANEL: CPT | Performed by: EMERGENCY MEDICINE

## 2024-06-27 PROCEDURE — 74177 CT ABD & PELVIS W/CONTRAST: CPT

## 2024-06-27 RX ORDER — NAPROXEN 500 MG/1
500 TABLET ORAL 2 TIMES DAILY WITH MEALS
Qty: 30 TABLET | Refills: 0 | Status: SHIPPED | OUTPATIENT
Start: 2024-06-27 | End: 2024-06-27

## 2024-06-27 RX ORDER — LIDOCAINE 50 MG/G
1 PATCH TOPICAL DAILY
Qty: 30 PATCH | Refills: 0 | Status: SHIPPED | OUTPATIENT
Start: 2024-06-27 | End: 2024-06-27

## 2024-06-27 RX ORDER — LIDOCAINE 50 MG/G
1 PATCH TOPICAL DAILY
Qty: 30 PATCH | Refills: 0 | Status: SHIPPED | OUTPATIENT
Start: 2024-06-27

## 2024-06-27 RX ORDER — METHOCARBAMOL 500 MG/1
500 TABLET, FILM COATED ORAL 3 TIMES DAILY PRN
Qty: 20 TABLET | Refills: 0 | Status: SHIPPED | OUTPATIENT
Start: 2024-06-27

## 2024-06-27 RX ORDER — ACETAMINOPHEN 500 MG
1000 TABLET ORAL EVERY 6 HOURS PRN
Qty: 40 TABLET | Refills: 0 | Status: SHIPPED | OUTPATIENT
Start: 2024-06-27 | End: 2024-06-27

## 2024-06-27 RX ORDER — KETOROLAC TROMETHAMINE 30 MG/ML
15 INJECTION, SOLUTION INTRAMUSCULAR; INTRAVENOUS ONCE
Status: COMPLETED | OUTPATIENT
Start: 2024-06-27 | End: 2024-06-27

## 2024-06-27 RX ORDER — ACETAMINOPHEN 500 MG
1000 TABLET ORAL EVERY 6 HOURS PRN
Qty: 40 TABLET | Refills: 0 | Status: SHIPPED | OUTPATIENT
Start: 2024-06-27

## 2024-06-27 RX ORDER — LIDOCAINE 50 MG/G
1 PATCH TOPICAL ONCE
Status: DISCONTINUED | OUTPATIENT
Start: 2024-06-27 | End: 2024-06-27 | Stop reason: HOSPADM

## 2024-06-27 RX ORDER — METHOCARBAMOL 500 MG/1
500 TABLET, FILM COATED ORAL 3 TIMES DAILY PRN
Qty: 20 TABLET | Refills: 0 | Status: SHIPPED | OUTPATIENT
Start: 2024-06-27 | End: 2024-06-27

## 2024-06-27 RX ORDER — NAPROXEN 500 MG/1
500 TABLET ORAL 2 TIMES DAILY WITH MEALS
Qty: 30 TABLET | Refills: 0 | Status: SHIPPED | OUTPATIENT
Start: 2024-06-27

## 2024-06-27 RX ADMIN — LIDOCAINE 1 PATCH: 700 PATCH TOPICAL at 09:02

## 2024-06-27 RX ADMIN — KETOROLAC TROMETHAMINE 15 MG: 30 INJECTION, SOLUTION INTRAMUSCULAR at 08:57

## 2024-06-27 RX ADMIN — IOHEXOL 100 ML: 350 INJECTION, SOLUTION INTRAVENOUS at 09:48

## 2024-06-27 NOTE — DISCHARGE INSTRUCTIONS
Follow-up with comprehensive spine.    Come back to the emergency department for urinary incontinence, urinary retention, numbness, weakness.    CT scan today showed fibroids in your uterus as well as anterior listhesis of your spine.  These are chronic findings.    Your pain is likely secondary to a strain of the muscles in your back.    Take the Tylenol every 6 hours, naproxen every 12 hours.  Use the Robaxin muscle relaxer 3 times daily as needed for spasms.  This medication make you tired.  Do not take it and drive.  Use lidocaine patches 12 hours on and then 12 hours off.    If your insurance does not pay for lidocaine patches you can obtain them over-the-counter under the name Salonpas

## 2024-06-27 NOTE — ED PROVIDER NOTES
History  Chief Complaint   Patient presents with    Flank Pain     Left lower flank pain that wraps around to the front.  Describes as severe in nature.  Tylenol not helping.  Pt believes it is from her kidney.  Denies urinary symptoms     58 y/o female hx of HTN, COPD, psych disorder presents for flank pain primarily on the left. Radiating to the LLQ.  Pain started yesterday.  Patient states that the pain is worse with palpation.  Worse with movement.  Tried Tylenol without relief of symptoms.    No heavy lifting or inciting incident that the patient can think of.    No nausea, vomiting, dysuria, hematuria, increased frequency of urination, vaginal discharge, fevers.    History of pancreatitis.  Does not use alcohol regularly.    Pain Is not colicky.  No history of ureterolithiasis.    No chest pain.  No shortness of breath.  Denies any pleurisy in regards to the pain.  No hemoptysis.  Denies unilateral lower extremity edema.  Denies estrogen supplementation.      Flank Pain  Pain quality: aching and sharp    Pain severity:  Severe  Onset quality:  Sudden  Timing:  Constant  Chronicity:  New  Relieved by:  Nothing  Worsened by:  Movement and palpation  Ineffective treatments:  None tried  Associated symptoms: no shortness of breath        Prior to Admission Medications   Prescriptions Last Dose Informant Patient Reported? Taking?   benzonatate (TESSALON PERLES) 100 mg capsule   No No   Sig: Take 1 capsule (100 mg total) by mouth 3 (three) times a day as needed for cough   lisinopril (ZESTRIL) 5 mg tablet   No No   Sig: Take 1 tablet (5 mg total) by mouth daily   ondansetron (Zofran ODT) 4 mg disintegrating tablet   No No   Sig: Take 1 tablet (4 mg total) by mouth every 6 (six) hours as needed for nausea or vomiting      Facility-Administered Medications: None       Past Medical History:   Diagnosis Date    Hypertension     Psychiatric disorder        Past Surgical History:   Procedure Laterality Date    ABDOMINAL  SURGERY      gallbladder removal       History reviewed. No pertinent family history.  I have reviewed and agree with the history as documented.    E-Cigarette/Vaping    E-Cigarette Use Never User      E-Cigarette/Vaping Substances     Social History     Tobacco Use    Smoking status: Every Day     Current packs/day: 0.50     Types: Cigarettes    Smokeless tobacco: Never   Vaping Use    Vaping status: Never Used   Substance Use Topics    Alcohol use: Not Currently    Drug use: Yes     Types: Marijuana     Comment: last used 07/07/22       Review of Systems   Respiratory:  Negative for shortness of breath.    Gastrointestinal:  Positive for abdominal pain.   Genitourinary:  Positive for flank pain.   All other systems reviewed and are negative.      Physical Exam  Physical Exam  Vitals and nursing note reviewed.   Constitutional:       General: She is not in acute distress.     Appearance: Normal appearance. She is not ill-appearing.   HENT:      Head: Normocephalic and atraumatic.      Right Ear: External ear normal.      Left Ear: External ear normal.      Nose: Nose normal.      Mouth/Throat:      Mouth: Mucous membranes are moist.   Eyes:      General:         Right eye: No discharge.         Left eye: No discharge.      Conjunctiva/sclera: Conjunctivae normal.   Cardiovascular:      Rate and Rhythm: Normal rate and regular rhythm.      Pulses: Normal pulses.      Heart sounds: No murmur heard.  Pulmonary:      Effort: Pulmonary effort is normal.      Breath sounds: Normal breath sounds.   Abdominal:      General: Abdomen is flat. There is no distension.      Tenderness: There is abdominal tenderness (periumbilical). There is no right CVA tenderness, left CVA tenderness, guarding or rebound.   Musculoskeletal:         General: Tenderness present. Normal range of motion.      Cervical back: Normal range of motion.      Right lower leg: No edema.      Left lower leg: No edema.      Comments: No central spinal  tenderness.  Has paraspinal musculature tenderness throughout the left lumbar region most prominent in the upper lumbar region.   Skin:     General: Skin is warm.      Capillary Refill: Capillary refill takes less than 2 seconds.      Findings: No rash.   Neurological:      General: No focal deficit present.      Mental Status: She is alert. Mental status is at baseline.      Sensory: No sensory deficit.      Motor: No weakness.      Comments: Normal strength in the lower extremities bilaterally.  Normal sensation to light touch throughout the lower extremities.   Psychiatric:         Mood and Affect: Mood normal.         Behavior: Behavior normal.         Vital Signs  ED Triage Vitals   Temperature Pulse Respirations Blood Pressure SpO2   06/27/24 0837 06/27/24 0837 06/27/24 0837 06/27/24 0837 06/27/24 0837   97.5 °F (36.4 °C) 85 20 111/76 95 %      Temp Source Heart Rate Source Patient Position - Orthostatic VS BP Location FiO2 (%)   06/27/24 0837 06/27/24 0837 06/27/24 0837 06/27/24 0837 --   Oral Monitor Lying Right arm       Pain Score       06/27/24 0857       10 - Worst Possible Pain           Vitals:    06/27/24 0837   BP: 111/76   Pulse: 85   Patient Position - Orthostatic VS: Lying         Visual Acuity      ED Medications  Medications   lidocaine (LIDODERM) 5 % patch 1 patch (1 patch Topical Medication Applied 6/27/24 0902)   ketorolac (TORADOL) injection 15 mg (15 mg Intravenous Given 6/27/24 0857)   iohexol (OMNIPAQUE) 350 MG/ML injection (SINGLE-DOSE) 100 mL (100 mL Intravenous Given 6/27/24 0948)       Diagnostic Studies  Results Reviewed       Procedure Component Value Units Date/Time    Basic metabolic panel [[id number]] Collected: 06/27/24 0902    Lab Status: Final result Specimen: Blood from Arm, Right Updated: 06/27/24 0937     Sodium 140 mmol/L      Potassium 3.5 mmol/L      Chloride 100 mmol/L      CO2 30 mmol/L      ANION GAP 10 mmol/L      BUN 16 mg/dL      Creatinine 0.80 mg/dL      Glucose  95 mg/dL      Calcium 9.6 mg/dL      eGFR 82 ml/min/1.73sq m     Narrative:      National Kid[employer] Disease Foundation guidelines for Chronic Kidney Disease (CKD):     Stage 1 with normal or high GFR (GFR > 90 mL/min/1.73 square meters)    Stage 2 Mild CKD (GFR = 60-89 mL/min/1.73 square meters)    Stage 3A Moderate CKD (GFR = 45-59 mL/min/1.73 square meters)    Stage 3B Moderate CKD (GFR = 30-44 mL/min/1.73 square meters)    Stage 4 Severe CKD (GFR = 15-29 mL/min/1.73 square meters)    Stage 5 End Stage CKD (GFR <15 mL/min/1.73 square meters)  Note: GFR calculation is accurate only with a steady state creatinine    Hepatic function panel [[id number]]  (Abnormal) Collected: 06/27/24 0902    Lab Status: Final result Specimen: Blood from Arm, Right Updated: 06/27/24 0937     Total Bilirubin 1.65 mg/dL      Bilirubin, Direct 0.29 mg/dL      Alkaline Phosphatase 48 U/L      AST 20 U/L      ALT 24 U/L      Total Protein 7.3 g/dL      Albumin 4.5 g/dL     Lipase [[id number]]  (Normal) Collected: 06/27/24 0902    Lab Status: Final result Specimen: Blood from Arm, Right Updated: 06/27/24 0937     Lipase 16 u/L     UA w Reflex to Microscopic w Reflex to Culture [[id number]]  (Normal) Collected: 06/27/24 0902    Lab Status: Final result Specimen: Urine, Clean Catch Updated: 06/27/24 0936     Color, UA Yellow     Clarity, UA Clear     Specific Gravity, UA 1.010     pH, UA 6.5     Leukocytes, UA Negative     Nitrite, UA Negative     Protein, UA Negative mg/dl      Glucose, UA Negative mg/dl      Ketones, UA Negative mg/dl      Urobilinogen, UA 0.2 E.U./dl      Bilirubin, UA Negative     Occult Blood, UA Negative    HS Troponin 0hr (reflex protocol) [[id number]]  (Normal) Collected: 06/27/24 0902    Lab Status: Final result Specimen: Blood from Arm, Right Updated: 06/27/24 0934     hs TnI 0hr <2 ng/L     CBC and differential [[id number]] Collected: 06/27/24 0902    Lab Status: Final result Specimen: Blood from Arm, Right Updated:  06/27/24 0912     WBC 9.88 Thousand/uL      RBC 4.47 Million/uL      Hemoglobin 14.0 g/dL      Hematocrit 42.1 %      MCV 94 fL      MCH 31.3 pg      MCHC 33.3 g/dL      RDW 12.7 %      MPV 9.5 fL      Platelets 260 Thousands/uL      nRBC 0 /100 WBCs      Segmented % 67 %      Immature Grans % 0 %      Lymphocytes % 24 %      Monocytes % 8 %      Eosinophils Relative 1 %      Basophils Relative 0 %      Absolute Neutrophils 6.48 Thousands/µL      Absolute Immature Grans 0.03 Thousand/uL      Absolute Lymphocytes 2.41 Thousands/µL      Absolute Monocytes 0.80 Thousand/µL      Eosinophils Absolute 0.12 Thousand/µL      Basophils Absolute 0.04 Thousands/µL                    CT abdomen pelvis with contrast   Final Result by Zachary Campbell MD (06/27 1020)      No evidence of acute abdominopelvic process.      Chronic findings and negatives as above.         Workstation performed: [id number]                    Procedures  Procedures         ED Course  ED Course as of 06/27/24 1103   Thu Jun 27, 2024   0910 Procedure Note: EKG  Date/Time: 06/27/24 9:11 AM   Interpreted by: Qasim Alvarado  Indications / Diagnosis: flank pain  ECG reviewed by me, the ED Provider: yes   The EKG demonstrates:  Rhythm: normal sinus   Intervals: normal intervals  Axis: normal axis  QRS/Blocks: normal QRS except old q wave in v 1/2  ST Changes: No acute ST Changes, no STD/RANDY.     0938 Total Bilirubin(!): 1.65  1.91 three weeks ago   0940 LIPASE: 16                               SBIRT 20yo+      Flowsheet Row Most Recent Value   Initial Alcohol Screen: US AUDIT-C     1. How often do you have a drink containing alcohol? 0 Filed at: 06/27/2024 0838   2. How many drinks containing alcohol do you have on a typical day you are drinking?  0 Filed at: 06/27/2024 0838   3a. Male UNDER 65: How often do you have five or more drinks on one occasion? 0 Filed at: 06/27/2024 0838   3b. FEMALE Any Age, or MALE 65+: How often do you have 4 or  more drinks on one occassion? 0 Filed at: 06/27/2024 0838   Audit-C Score 0 Filed at: 06/27/2024 0838   KIMBERLEY: How many times in the past year have you...    Used an illegal drug or used a prescription medication for non-medical reasons? Never Filed at: 06/27/2024 0838                      Medical Decision Making  Differential including but not limited to acute intra-abdominal processes, colitis, diverticulitis, pancreatitis, atypical presentation of ACS, flank pain, myalgia, thoracolumbar strain, unlikely pyelonephritis as no urinary symptoms or fevers.    No signs of PE.  No dyspnea, chest pain, lower extremity edema, hemoptysis or risk factors for pulmonary embolism.    Workup with slightly elevated bilirubin though decreasing from previous.  No acute intra-abdominal processes.  Anterior listhesis noted on CT scan.  Will refer to comprehensive spine.    Workup otherwise without significant findings.  Will discharge home.  Return precautions given.        Problems Addressed:  Abdominal pain: acute illness or injury  Anterolisthesis of lumbar spine: acute illness or injury  Fibroid uterus: acute illness or injury  Lumbar strain, initial encounter: acute illness or injury    Amount and/or Complexity of Data Reviewed  Labs: ordered. Decision-making details documented in ED Course.  Radiology: ordered.    Risk  OTC drugs.  Prescription drug management.             Disposition  Final diagnoses:   Lumbar strain, initial encounter   Anterolisthesis of lumbar spine   Fibroid uterus   Abdominal pain     Time reflects when diagnosis was documented in both MDM as applicable and the Disposition within this note       Time User Action Codes Description Comment    6/27/2024 10:56 AM Qasim Alvarado [S39.012A] Lumbar strain, initial encounter     6/27/2024 10:57 AM Qasim Alvarado [M43.16] Anterolisthesis of lumbar spine     6/27/2024 10:57 AM Qasim Alvarado [D25.9] Fibroid uterus     6/27/2024 10:57 AM Christiano  Qasim Add [R10.9] Abdominal pain           ED Disposition       ED Disposition   Discharge    Condition   Stable    Date/Time   Thu Jun 27, 2024 1056    Comment   Jennifer Ceferino discharge to home/self care.                   Follow-up Information       Follow up With Specialties Details Why Contact Info Additional Information    Cheryl Fox MD Family Medicine Schedule an appointment as soon as possible for a visit  For re-evaluation as soon as possible [address] [facility]  [address]  [facility] [address] [phone]       [facility] Emergency Department Emergency Medicine  If symptoms worsen [address] [address] [phone] [facility] Emergency Department, [address] [address]            Patient's Medications   Discharge Prescriptions    ACETAMINOPHEN (TYLENOL) 500 MG TABLET    Take 2 tablets (1,000 mg total) by mouth every 6 (six) hours as needed for moderate pain       Start Date: 6/27/2024 End Date: --       Order Dose: 1,000 mg       Quantity: 40 tablet    Refills: 0    LIDOCAINE (LIDODERM) 5 %    Apply 1 patch topically over 12 hours daily Remove & Discard patch within 12 hours or as directed by MD       Start Date: 6/27/2024 End Date: --       Order Dose: 1 patch       Quantity: 30 patch    Refills: 0    METHOCARBAMOL (ROBAXIN) 500 MG TABLET    Take 1 tablet (500 mg total) by mouth 3 (three) times a day as needed for muscle spasms       Start Date: 6/27/2024 End Date: --       Order Dose: 500 mg       Quantity: 20 tablet    Refills: 0    NAPROXEN (NAPROSYN) 500 MG TABLET    Take 1 tablet (500 mg total) by mouth 2 (two) times a day with meals       Start Date: 6/27/2024 End Date: --       Order Dose: 500 mg       Quantity: 30 tablet    Refills: 0           PDMP Review       None            ED Provider  Electronically Signed by             Qasim Alvarado,   06/27/24 1103       Qasim Alvarado,   06/27/24 1100        Qasim Alvarado,   06/27/24 1109

## 2024-06-28 ENCOUNTER — TELEPHONE (OUTPATIENT)
Dept: PHYSICAL THERAPY | Facility: OTHER | Age: 59
End: 2024-06-28

## 2024-06-28 ENCOUNTER — TELEPHONE (OUTPATIENT)
Dept: FAMILY MEDICINE CLINIC | Facility: CLINIC | Age: 59
End: 2024-06-28

## 2024-06-28 NOTE — TELEPHONE ENCOUNTER
06/28/24 12:35 PM    Patient contacted post ED visit, first outreach attempt made. Message was left for patient to return a call to the [facility] Department at [facility]: Phone [phone].    Thank you.  Jose Ramon Leonardo  [facility]

## 2024-06-28 NOTE — TELEPHONE ENCOUNTER
"Call placed to the patient per [facility] referral.    First called the preferred phone number in chart (mobile) and was unable to dial out. Message stated \"not reachable\". I then tried to call the home number in chart. Pt's mother answered and phone number to [facility] along with hours were left with her. (Makeda) She will give the Pt the message next time she sees her.     This is the 1st attempt to reach the patient.  Will defer per protocol.    "

## 2024-07-02 NOTE — TELEPHONE ENCOUNTER
07/01/2024 10:38AM    Patient contacted post ED visit, second outreach attempt made. Message was left for patient to return a call to the [facility] Department at [facility]: Phone [phone].    Thank you.  Jose Ramon Leonardo  [facility]

## 2024-07-02 NOTE — TELEPHONE ENCOUNTER
07/02/24 2:00 PM    Patient contacted post ED visit, phone outreaches were unsuccessful; patient does not have MyChart, a MyChart letter has not been sent.     Thank you.  Jose Ramon Leonardo  [facility]

## 2024-07-03 NOTE — TELEPHONE ENCOUNTER
Attempt to call patient per [facility].    Unable to dialed out. Unable to leave message.    Patient's mom listed as secondary contact. V/M left for patient to call back. Phone number and hours of business provided.    This is the 2nd attempt to reach the patient. Will defer referral per protocol.

## 2024-07-17 ENCOUNTER — APPOINTMENT (EMERGENCY)
Dept: RADIOLOGY | Facility: HOSPITAL | Age: 59
End: 2024-07-17
Payer: MEDICARE

## 2024-07-17 ENCOUNTER — HOSPITAL ENCOUNTER (EMERGENCY)
Facility: HOSPITAL | Age: 59
Discharge: HOME | End: 2024-07-17
Attending: EMERGENCY MEDICINE
Payer: MEDICARE

## 2024-07-17 VITALS
SYSTOLIC BLOOD PRESSURE: 104 MMHG | HEART RATE: 89 BPM | OXYGEN SATURATION: 92 % | RESPIRATION RATE: 20 BRPM | DIASTOLIC BLOOD PRESSURE: 73 MMHG | TEMPERATURE: 97.8 F

## 2024-07-17 DIAGNOSIS — J44.1 COPD EXACERBATION (HCC): Primary | ICD-10-CM

## 2024-07-17 LAB
ALBUMIN SERPL BCG-MCNC: 4.4 G/DL (ref 3.5–5)
ALP SERPL-CCNC: 41 U/L (ref 34–104)
ALT SERPL W P-5'-P-CCNC: 14 U/L (ref 7–52)
ANION GAP SERPL CALCULATED.3IONS-SCNC: 12 MMOL/L (ref 4–13)
AST SERPL W P-5'-P-CCNC: 18 U/L (ref 13–39)
BASOPHILS # BLD AUTO: 0.07 THOUSANDS/ÂΜL (ref 0–0.1)
BASOPHILS NFR BLD AUTO: 1 % (ref 0–1)
BILIRUB SERPL-MCNC: 1.45 MG/DL (ref 0.2–1)
BUN SERPL-MCNC: 22 MG/DL (ref 5–25)
CALCIUM SERPL-MCNC: 9.7 MG/DL (ref 8.4–10.2)
CARDIAC TROPONIN I PNL SERPL HS: <2 NG/L
CHLORIDE SERPL-SCNC: 104 MMOL/L (ref 96–108)
CO2 SERPL-SCNC: 25 MMOL/L (ref 21–32)
CREAT SERPL-MCNC: 0.8 MG/DL (ref 0.6–1.3)
EOSINOPHIL # BLD AUTO: 0.37 THOUSAND/ÂΜL (ref 0–0.61)
EOSINOPHIL NFR BLD AUTO: 3 % (ref 0–6)
ERYTHROCYTE [DISTWIDTH] IN BLOOD BY AUTOMATED COUNT: 12.6 % (ref 11.6–15.1)
GFR SERPL CREATININE-BSD FRML MDRD: 82 ML/MIN/1.73SQ M
GLUCOSE SERPL-MCNC: 104 MG/DL (ref 65–140)
HCT VFR BLD AUTO: 40.4 % (ref 34.8–46.1)
HGB BLD-MCNC: 13.5 G/DL (ref 11.5–15.4)
IMM GRANULOCYTES # BLD AUTO: 0.03 THOUSAND/UL (ref 0–0.2)
IMM GRANULOCYTES NFR BLD AUTO: 0 % (ref 0–2)
LYMPHOCYTES # BLD AUTO: 2.81 THOUSANDS/ÂΜL (ref 0.6–4.47)
LYMPHOCYTES NFR BLD AUTO: 26 % (ref 14–44)
MCH RBC QN AUTO: 31 PG (ref 26.8–34.3)
MCHC RBC AUTO-ENTMCNC: 33.4 G/DL (ref 31.4–37.4)
MCV RBC AUTO: 93 FL (ref 82–98)
MONOCYTES # BLD AUTO: 0.79 THOUSAND/ÂΜL (ref 0.17–1.22)
MONOCYTES NFR BLD AUTO: 7 % (ref 4–12)
NEUTROPHILS # BLD AUTO: 6.74 THOUSANDS/ÂΜL (ref 1.85–7.62)
NEUTS SEG NFR BLD AUTO: 63 % (ref 43–75)
NRBC BLD AUTO-RTO: 0 /100 WBCS
PLATELET # BLD AUTO: 220 THOUSANDS/UL (ref 149–390)
PMV BLD AUTO: 10 FL (ref 8.9–12.7)
POTASSIUM SERPL-SCNC: 3.3 MMOL/L (ref 3.5–5.3)
PROT SERPL-MCNC: 7.1 G/DL (ref 6.4–8.4)
RBC # BLD AUTO: 4.36 MILLION/UL (ref 3.81–5.12)
SODIUM SERPL-SCNC: 141 MMOL/L (ref 135–147)
WBC # BLD AUTO: 10.81 THOUSAND/UL (ref 4.31–10.16)

## 2024-07-17 PROCEDURE — 36415 COLL VENOUS BLD VENIPUNCTURE: CPT | Performed by: EMERGENCY MEDICINE

## 2024-07-17 PROCEDURE — 99285 EMERGENCY DEPT VISIT HI MDM: CPT

## 2024-07-17 PROCEDURE — 96374 THER/PROPH/DIAG INJ IV PUSH: CPT

## 2024-07-17 PROCEDURE — 94640 AIRWAY INHALATION TREATMENT: CPT

## 2024-07-17 PROCEDURE — 71045 X-RAY EXAM CHEST 1 VIEW: CPT

## 2024-07-17 PROCEDURE — 84484 ASSAY OF TROPONIN QUANT: CPT | Performed by: EMERGENCY MEDICINE

## 2024-07-17 PROCEDURE — 99285 EMERGENCY DEPT VISIT HI MDM: CPT | Performed by: EMERGENCY MEDICINE

## 2024-07-17 PROCEDURE — 85025 COMPLETE CBC W/AUTO DIFF WBC: CPT | Performed by: EMERGENCY MEDICINE

## 2024-07-17 PROCEDURE — 96375 TX/PRO/DX INJ NEW DRUG ADDON: CPT

## 2024-07-17 PROCEDURE — 80053 COMPREHEN METABOLIC PANEL: CPT | Performed by: EMERGENCY MEDICINE

## 2024-07-17 PROCEDURE — 93005 ELECTROCARDIOGRAM TRACING: CPT

## 2024-07-17 RX ORDER — IPRATROPIUM BROMIDE AND ALBUTEROL SULFATE 2.5; .5 MG/3ML; MG/3ML
3 SOLUTION RESPIRATORY (INHALATION) ONCE
Status: COMPLETED | OUTPATIENT
Start: 2024-07-17 | End: 2024-07-17

## 2024-07-17 RX ORDER — KETOROLAC TROMETHAMINE 30 MG/ML
15 INJECTION, SOLUTION INTRAMUSCULAR; INTRAVENOUS ONCE
Status: COMPLETED | OUTPATIENT
Start: 2024-07-17 | End: 2024-07-17

## 2024-07-17 RX ORDER — ONDANSETRON 2 MG/ML
4 INJECTION INTRAMUSCULAR; INTRAVENOUS ONCE
Status: COMPLETED | OUTPATIENT
Start: 2024-07-17 | End: 2024-07-17

## 2024-07-17 RX ORDER — PREDNISONE 20 MG/1
40 TABLET ORAL DAILY
Qty: 8 TABLET | Refills: 0 | Status: SHIPPED | OUTPATIENT
Start: 2024-07-18 | End: 2024-07-22

## 2024-07-17 RX ORDER — METHYLPREDNISOLONE SODIUM SUCCINATE 125 MG/2ML
125 INJECTION, POWDER, LYOPHILIZED, FOR SOLUTION INTRAMUSCULAR; INTRAVENOUS ONCE
Status: COMPLETED | OUTPATIENT
Start: 2024-07-17 | End: 2024-07-17

## 2024-07-17 RX ORDER — ACETAMINOPHEN 325 MG/1
650 TABLET ORAL ONCE
Status: COMPLETED | OUTPATIENT
Start: 2024-07-17 | End: 2024-07-17

## 2024-07-17 RX ADMIN — IPRATROPIUM BROMIDE AND ALBUTEROL SULFATE 3 ML: 2.5; .5 SOLUTION RESPIRATORY (INHALATION) at 21:23

## 2024-07-17 RX ADMIN — METHYLPREDNISOLONE SODIUM SUCCINATE 125 MG: 125 INJECTION, POWDER, FOR SOLUTION INTRAMUSCULAR; INTRAVENOUS at 21:23

## 2024-07-17 RX ADMIN — ACETAMINOPHEN 650 MG: 325 TABLET, FILM COATED ORAL at 20:45

## 2024-07-17 RX ADMIN — ONDANSETRON 4 MG: 2 INJECTION INTRAMUSCULAR; INTRAVENOUS at 21:23

## 2024-07-17 RX ADMIN — KETOROLAC TROMETHAMINE 15 MG: 30 INJECTION, SOLUTION INTRAMUSCULAR at 20:45

## 2024-07-18 ENCOUNTER — VBI (OUTPATIENT)
Dept: FAMILY MEDICINE CLINIC | Facility: CLINIC | Age: 59
End: 2024-07-18

## 2024-07-18 LAB
ATRIAL RATE: 81 BPM
P AXIS: 73 DEGREES
PR INTERVAL: 154 MS
QRS AXIS: 76 DEGREES
QRSD INTERVAL: 86 MS
QT INTERVAL: 380 MS
QTC INTERVAL: 441 MS
T WAVE AXIS: 82 DEGREES
VENTRICULAR RATE: 81 BPM

## 2024-07-18 PROCEDURE — 93010 ELECTROCARDIOGRAM REPORT: CPT | Performed by: INTERNAL MEDICINE
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
## 2024-07-18 NOTE — TELEPHONE ENCOUNTER
07/18/24 10:15 AM    Patient contacted post ED visit, outreach attempt made but message could not be left. Additional outreach attempt will be made.     Thank you.  Raciel Devlin MA  [facility]

## 2024-07-19 ENCOUNTER — TELEPHONE (OUTPATIENT)
Dept: FAMILY MEDICINE CLINIC | Facility: CLINIC | Age: 59
End: 2024-07-19

## 2024-07-19 NOTE — TELEPHONE ENCOUNTER
07/19/24 11:36 AM    Patient contacted post ED visit, phone outreaches were unsuccessful; patient does not have MyChart, a MyChart letter has not been sent.     Thank you.  Makeda Lowery MA  [facility]

## 2024-07-29 NOTE — TELEPHONE ENCOUNTER
07/29/24 2:16 PM    Patient contacted post ED visit, phone outreaches were unsuccessful; patient does not have MyChart, a MyChart letter has not been sent.  Completed with new encounter by lencho on 7-19    Thank you.  Raciel Devlin MA  [facility]

## 2024-11-28 ENCOUNTER — APPOINTMENT (EMERGENCY)
Dept: RADIOLOGY | Facility: HOSPITAL | Age: 59
End: 2024-11-28
Payer: MEDICARE

## 2024-11-28 ENCOUNTER — HOSPITAL ENCOUNTER (EMERGENCY)
Facility: HOSPITAL | Age: 59
Discharge: HOME | End: 2024-11-28
Attending: EMERGENCY MEDICINE
Payer: MEDICARE

## 2024-11-28 VITALS
SYSTOLIC BLOOD PRESSURE: 128 MMHG | OXYGEN SATURATION: 94 % | HEART RATE: 103 BPM | RESPIRATION RATE: 18 BRPM | TEMPERATURE: 98.6 F | HEIGHT: 64 IN | BODY MASS INDEX: 26.98 KG/M2 | WEIGHT: 158 LBS | DIASTOLIC BLOOD PRESSURE: 82 MMHG

## 2024-11-28 DIAGNOSIS — E87.6 HYPOKALEMIA: ICD-10-CM

## 2024-11-28 DIAGNOSIS — J06.9 VIRAL URI WITH COUGH: Primary | ICD-10-CM

## 2024-11-28 DIAGNOSIS — J44.1 COPD EXACERBATION (HCC): ICD-10-CM

## 2024-11-28 LAB
ALBUMIN SERPL BCG-MCNC: 4.3 G/DL (ref 3.5–5)
ALP SERPL-CCNC: 64 U/L (ref 34–104)
ALT SERPL W P-5'-P-CCNC: 32 U/L (ref 7–52)
ANION GAP SERPL CALCULATED.3IONS-SCNC: 7 MMOL/L (ref 4–13)
AST SERPL W P-5'-P-CCNC: 23 U/L (ref 13–39)
BASOPHILS # BLD AUTO: 0.03 THOUSANDS/ΜL (ref 0–0.1)
BASOPHILS NFR BLD AUTO: 0 % (ref 0–1)
BILIRUB SERPL-MCNC: 1.34 MG/DL (ref 0.2–1)
BUN SERPL-MCNC: 17 MG/DL (ref 5–25)
CALCIUM SERPL-MCNC: 9.3 MG/DL (ref 8.4–10.2)
CARDIAC TROPONIN I PNL SERPL HS: <2 NG/L (ref ?–50)
CHLORIDE SERPL-SCNC: 97 MMOL/L (ref 96–108)
CO2 SERPL-SCNC: 31 MMOL/L (ref 21–32)
CREAT SERPL-MCNC: 0.7 MG/DL (ref 0.6–1.3)
EOSINOPHIL # BLD AUTO: 0.09 THOUSAND/ΜL (ref 0–0.61)
EOSINOPHIL NFR BLD AUTO: 1 % (ref 0–6)
ERYTHROCYTE [DISTWIDTH] IN BLOOD BY AUTOMATED COUNT: 12.2 % (ref 11.6–15.1)
FLUAV RNA RESP QL NAA+PROBE: NEGATIVE
FLUBV RNA RESP QL NAA+PROBE: NEGATIVE
GFR SERPL CREATININE-BSD FRML MDRD: 96 ML/MIN/1.73SQ M
GLUCOSE SERPL-MCNC: 122 MG/DL (ref 65–140)
HCT VFR BLD AUTO: 40.6 % (ref 34.8–46.1)
HGB BLD-MCNC: 13.2 G/DL (ref 11.5–15.4)
IMM GRANULOCYTES # BLD AUTO: 0.03 THOUSAND/UL (ref 0–0.2)
IMM GRANULOCYTES NFR BLD AUTO: 0 % (ref 0–2)
LYMPHOCYTES # BLD AUTO: 1.62 THOUSANDS/ΜL (ref 0.6–4.47)
LYMPHOCYTES NFR BLD AUTO: 15 % (ref 14–44)
MCH RBC QN AUTO: 29.9 PG (ref 26.8–34.3)
MCHC RBC AUTO-ENTMCNC: 32.5 G/DL (ref 31.4–37.4)
MCV RBC AUTO: 92 FL (ref 82–98)
MONOCYTES # BLD AUTO: 1.15 THOUSAND/ΜL (ref 0.17–1.22)
MONOCYTES NFR BLD AUTO: 11 % (ref 4–12)
NEUTROPHILS # BLD AUTO: 8.02 THOUSANDS/ΜL (ref 1.85–7.62)
NEUTS SEG NFR BLD AUTO: 73 % (ref 43–75)
NRBC BLD AUTO-RTO: 0 /100 WBCS
PLATELET # BLD AUTO: 246 THOUSANDS/UL (ref 149–390)
PMV BLD AUTO: 9.2 FL (ref 8.9–12.7)
POTASSIUM SERPL-SCNC: 3.2 MMOL/L (ref 3.5–5.3)
PROT SERPL-MCNC: 7.1 G/DL (ref 6.4–8.4)
RBC # BLD AUTO: 4.42 MILLION/UL (ref 3.81–5.12)
RSV RNA RESP QL NAA+PROBE: NEGATIVE
S PYO DNA THROAT QL NAA+PROBE: NOT DETECTED
SARS-COV-2 RNA RESP QL NAA+PROBE: NEGATIVE
SODIUM SERPL-SCNC: 135 MMOL/L (ref 135–147)
WBC # BLD AUTO: 10.94 THOUSAND/UL (ref 4.31–10.16)

## 2024-11-28 PROCEDURE — 84484 ASSAY OF TROPONIN QUANT: CPT | Performed by: PHYSICIAN ASSISTANT

## 2024-11-28 PROCEDURE — 36415 COLL VENOUS BLD VENIPUNCTURE: CPT | Performed by: PHYSICIAN ASSISTANT

## 2024-11-28 PROCEDURE — 99285 EMERGENCY DEPT VISIT HI MDM: CPT | Performed by: PHYSICIAN ASSISTANT

## 2024-11-28 PROCEDURE — 87651 STREP A DNA AMP PROBE: CPT | Performed by: PHYSICIAN ASSISTANT

## 2024-11-28 PROCEDURE — 96374 THER/PROPH/DIAG INJ IV PUSH: CPT

## 2024-11-28 PROCEDURE — 99283 EMERGENCY DEPT VISIT LOW MDM: CPT

## 2024-11-28 PROCEDURE — 94640 AIRWAY INHALATION TREATMENT: CPT

## 2024-11-28 PROCEDURE — 80053 COMPREHEN METABOLIC PANEL: CPT | Performed by: PHYSICIAN ASSISTANT

## 2024-11-28 PROCEDURE — 93005 ELECTROCARDIOGRAM TRACING: CPT

## 2024-11-28 PROCEDURE — 0241U HB NFCT DS VIR RESP RNA 4 TRGT: CPT | Performed by: PHYSICIAN ASSISTANT

## 2024-11-28 PROCEDURE — 71045 X-RAY EXAM CHEST 1 VIEW: CPT

## 2024-11-28 PROCEDURE — 85025 COMPLETE CBC W/AUTO DIFF WBC: CPT | Performed by: PHYSICIAN ASSISTANT

## 2024-11-28 RX ORDER — IPRATROPIUM BROMIDE AND ALBUTEROL SULFATE 2.5; .5 MG/3ML; MG/3ML
3 SOLUTION RESPIRATORY (INHALATION) ONCE
Status: COMPLETED | OUTPATIENT
Start: 2024-11-28 | End: 2024-11-28

## 2024-11-28 RX ORDER — ALBUTEROL SULFATE 0.83 MG/ML
2.5 SOLUTION RESPIRATORY (INHALATION) EVERY 6 HOURS PRN
Qty: 75 ML | Refills: 0 | Status: SHIPPED | OUTPATIENT
Start: 2024-11-28

## 2024-11-28 RX ORDER — AZITHROMYCIN 250 MG/1
TABLET, FILM COATED ORAL
Qty: 6 TABLET | Refills: 0 | Status: SHIPPED | OUTPATIENT
Start: 2024-11-28 | End: 2024-12-02

## 2024-11-28 RX ORDER — POTASSIUM CHLORIDE 1500 MG/1
40 TABLET, EXTENDED RELEASE ORAL ONCE
Status: COMPLETED | OUTPATIENT
Start: 2024-11-28 | End: 2024-11-28

## 2024-11-28 RX ORDER — BENZONATATE 100 MG/1
100 CAPSULE ORAL ONCE
Status: COMPLETED | OUTPATIENT
Start: 2024-11-28 | End: 2024-11-28

## 2024-11-28 RX ORDER — BENZONATATE 100 MG/1
100 CAPSULE ORAL EVERY 8 HOURS
Qty: 21 CAPSULE | Refills: 0 | Status: SHIPPED | OUTPATIENT
Start: 2024-11-28

## 2024-11-28 RX ORDER — METHYLPREDNISOLONE SODIUM SUCCINATE 125 MG/2ML
80 INJECTION, POWDER, LYOPHILIZED, FOR SOLUTION INTRAMUSCULAR; INTRAVENOUS ONCE
Status: COMPLETED | OUTPATIENT
Start: 2024-11-28 | End: 2024-11-28

## 2024-11-28 RX ORDER — PREDNISONE 20 MG/1
40 TABLET ORAL DAILY
Qty: 8 TABLET | Refills: 0 | Status: SHIPPED | OUTPATIENT
Start: 2024-11-29 | End: 2024-12-03

## 2024-11-28 RX ADMIN — POTASSIUM CHLORIDE 40 MEQ: 1500 TABLET, EXTENDED RELEASE ORAL at 09:47

## 2024-11-28 RX ADMIN — IPRATROPIUM BROMIDE AND ALBUTEROL SULFATE 3 ML: 2.5; .5 SOLUTION RESPIRATORY (INHALATION) at 09:09

## 2024-11-28 RX ADMIN — METHYLPREDNISOLONE SODIUM SUCCINATE 80 MG: 125 INJECTION, POWDER, FOR SOLUTION INTRAMUSCULAR; INTRAVENOUS at 09:10

## 2024-11-28 RX ADMIN — IPRATROPIUM BROMIDE AND ALBUTEROL SULFATE 3 ML: 2.5; .5 SOLUTION RESPIRATORY (INHALATION) at 09:46

## 2024-11-28 RX ADMIN — BENZONATATE 100 MG: 100 CAPSULE ORAL at 09:09

## 2024-11-28 NOTE — ED PROVIDER NOTES
Time reflects when diagnosis was documented in both MDM as applicable and the Disposition within this note       Time User Action Codes Description Comment    11/28/2024 10:10 AM Yumiko Villafuerte [J06.9] Viral URI with cough     11/28/2024 10:10 AM Yumiko Villafuerte [J44.1] COPD exacerbation (HCC)     11/28/2024 10:15 AM Yumiko Villafuerte [E87.6] Hypokalemia           ED Disposition       ED Disposition   Discharge    Condition   Stable    Date/Time   Thu Nov 28, 2024 10:09 AM    Comment   Jennifer Grahamuliffe discharge to home/self care.                   Assessment & Plan       Medical Decision Making  Patient is a 58 y/o female with a PMHx of HTN, presenting to the ED for evaluation of a cough x1 week.    DDx including but not limited to:  URI, bronchitis, bronchiolitis, pneumonia, viral illness, COVID 19, COPD exacerbation; doubt cardiac etiology or PE.    Patient has bilateral expiratory wheezing on exam but is not in any respiratory distress.  She was given a DuoNeb and steroids with significant improvement of wheezing.  Chest x-ray shows evidence of pneumonia.  EKG normal sinus rhythm and troponin is negative.  Labs notable for a mild hypokalemia but otherwise unremarkable.  COVID/flu/RSV and strep swabs negative.  Patient was given a prescription for 4 additional days of steroids, cough medication and azithromycin for COPD exacerbation.  She was also given a nebulizer and albuterol nebulizer solution.  She was advised to schedule appointment with her PCP for close follow-up or return to the ED if she changes any new/worsening symptoms.    The management plan was discussed in detail with the patient at bedside and all questions were answered. Strict ED return instructions were discussed at bedside. Prior to discharge, both verbal and written instructions were provided. We discussed the signs and symptoms that should prompt the patient to return to the ED. All questions were answered and the patient  was comfortable with the plan of care and discharged home. The patient agrees to return to the Emergency Department for concerns and/or progression of illness.        Amount and/or Complexity of Data Reviewed  Labs: ordered.  Radiology: ordered.    Risk  Prescription drug management.             Medications   ipratropium-albuterol (DUO-NEB) 0.5-2.5 mg/3 mL inhalation solution 3 mL (3 mL Nebulization Given 11/28/24 0909)   benzonatate (TESSALON PERLES) capsule 100 mg (100 mg Oral Given 11/28/24 0909)   methylPREDNISolone sodium succinate (Solu-MEDROL) injection 80 mg (80 mg Intravenous Given 11/28/24 0910)   ipratropium-albuterol (DUO-NEB) 0.5-2.5 mg/3 mL inhalation solution 3 mL (3 mL Nebulization Given 11/28/24 0946)   potassium chloride (Klor-Con M20) CR tablet 40 mEq (40 mEq Oral Given 11/28/24 0947)       ED Risk Strat Scores   HEART Risk Score      Flowsheet Row Most Recent Value   Heart Score Risk Calculator    History 0 Filed at: 11/28/2024 0906   ECG 0 Filed at: 11/28/2024 0906   Age 1 Filed at: 11/28/2024 0906   Risk Factors 1 Filed at: 11/28/2024 0906   Troponin 0 Filed at: 11/28/2024 0906   HEART Score 2 Filed at: 11/28/2024 0906                               SBIRT 22yo+      Flowsheet Row Most Recent Value   Initial Alcohol Screen: US AUDIT-C     1. How often do you have a drink containing alcohol? 0 Filed at: 11/28/2024 0842   2. How many drinks containing alcohol do you have on a typical day you are drinking?  0 Filed at: 11/28/2024 0842   3b. FEMALE Any Age, or MALE 65+: How often do you have 4 or more drinks on one occassion? 0 Filed at: 11/28/2024 0842   Audit-C Score 0 Filed at: 11/28/2024 0842   KIMBERLEY: How many times in the past year have you...    Used an illegal drug or used a prescription medication for non-medical reasons? Never Filed at: 11/28/2024 0842                            History of Present Illness       Chief Complaint   Patient presents with    Flu Symptoms     Sick for about a week,  productive cough, 5 episodes of vomiting through the week, chest discomfort from cough per patient        Past Medical History:   Diagnosis Date    Hypertension     Psychiatric disorder       Past Surgical History:   Procedure Laterality Date    ABDOMINAL SURGERY      gallbladder removal      History reviewed. No pertinent family history.   Social History     Tobacco Use    Smoking status: Every Day     Current packs/day: 0.50     Types: Cigarettes    Smokeless tobacco: Never   Vaping Use    Vaping status: Never Used   Substance Use Topics    Alcohol use: Not Currently    Drug use: Yes     Types: Marijuana     Comment: last used 07/07/22      E-Cigarette/Vaping    E-Cigarette Use Never User       E-Cigarette/Vaping Substances    Nicotine Yes       I have reviewed and agree with the history as documented.     Patient is a 60 y/o female with a PMHx of COPD, HTN and tobacco use, presenting to the ED for evaluation of a cough x1 week.  Patient states that she has had a cough, nausea vomiting and sore throat for the past week.  She states that the cough is occasionally productive of clear or yellow mucus.  She reports chest discomfort and shortness of breath only with coughing episodes. She has had some wheezing but has been using her albuterol inhaler with improvement. She denies any fevers or chills.  She denies any pleuritic pain, hemoptysis, recent travel, recent surgery, unilateral calf pain/swelling, exogenous estrogen use, malignancy or history of PE/DVT.  Patient also notes that she has had 4-5 episodes of nonbloody/nonbilious vomiting throughout the week that happened at random and were not post-tussive.  She denies any associated nausea, abdominal pain, diarrhea, constipation or urinary symptoms.  She is currently living at a shelter and says that other individuals have also had a cough and similar symptoms.        Review of Systems   Constitutional:  Negative for chills and fever.   HENT:  Positive for  congestion, rhinorrhea and sore throat.    Eyes:  Negative for visual disturbance.   Respiratory:  Positive for cough and wheezing.    Cardiovascular:  Positive for chest pain (with coughing). Negative for palpitations and leg swelling.   Gastrointestinal:  Positive for vomiting. Negative for abdominal pain, constipation, diarrhea and nausea.   Genitourinary:  Negative for dysuria, flank pain, frequency and hematuria.   Musculoskeletal:  Negative for back pain and neck pain.   Skin:  Negative for rash.   Neurological:  Negative for dizziness, syncope, weakness and headaches.   All other systems reviewed and are negative.          Objective       ED Triage Vitals [11/28/24 0840]   Temperature Pulse Blood Pressure Respirations SpO2 Patient Position - Orthostatic VS   98.6 °F (37 °C) (!) 114 128/82 20 98 % Sitting      Temp Source Heart Rate Source BP Location FiO2 (%) Pain Score    Oral Monitor Right arm -- 10 - Worst Possible Pain      Vitals      Date and Time Temp Pulse SpO2 Resp BP Pain Score FACES Pain Rating User   11/28/24 1008 -- 103 94 % 18 -- -- --    11/28/24 1000 -- 104 100 % 18 -- -- -- DU   11/28/24 0930 -- 102 93 % 20 -- -- --    11/28/24 0915 -- 104 93 % 18 -- -- --    11/28/24 0852 -- 109 91 % -- -- -- --    11/28/24 0840 98.6 °F (37 °C) 114 98 % 20 128/82 10 - Worst Possible Pain -- DU            Physical Exam  Vitals and nursing note reviewed.   Constitutional:       General: She is awake.      Appearance: Normal appearance. She is well-developed. She is not toxic-appearing or diaphoretic.   HENT:      Head: Normocephalic and atraumatic.      Right Ear: External ear normal.      Left Ear: External ear normal.      Nose: Nose normal.      Mouth/Throat:      Lips: Pink.      Mouth: Mucous membranes are moist.      Pharynx: Posterior oropharyngeal erythema present. No oropharyngeal exudate, uvula swelling or postnasal drip.      Tonsils: No tonsillar exudate or tonsillar abscesses.   Eyes:       General: Lids are normal. No scleral icterus.     Conjunctiva/sclera: Conjunctivae normal.      Pupils: Pupils are equal, round, and reactive to light.   Cardiovascular:      Rate and Rhythm: Regular rhythm. Tachycardia present.      Pulses: Normal pulses.           Radial pulses are 2+ on the right side and 2+ on the left side.      Heart sounds: Normal heart sounds, S1 normal and S2 normal.   Pulmonary:      Effort: Pulmonary effort is normal. No tachypnea, accessory muscle usage or respiratory distress.      Breath sounds: No stridor. Wheezing present. No decreased breath sounds, rhonchi or rales.      Comments: Diffuse expiratory wheezing bilaterally. No tachypnea, accessory muscle usage or retractions.  Patient is able to speak in full sentences without difficulty.    Abdominal:      General: Abdomen is flat. Bowel sounds are normal. There is no distension.      Palpations: Abdomen is soft.      Tenderness: There is no abdominal tenderness. There is no right CVA tenderness, left CVA tenderness, guarding or rebound.   Musculoskeletal:      Cervical back: Full passive range of motion without pain and neck supple. No signs of trauma. No pain with movement.      Right lower leg: No edema.      Left lower leg: No edema.   Lymphadenopathy:      Cervical: No cervical adenopathy.   Skin:     General: Skin is warm and dry.      Capillary Refill: Capillary refill takes less than 2 seconds.      Coloration: Skin is not cyanotic, jaundiced or pale.   Neurological:      Mental Status: She is alert and oriented to person, place, and time.      GCS: GCS eye subscore is 4. GCS verbal subscore is 5. GCS motor subscore is 6.      Gait: Gait normal.   Psychiatric:         Mood and Affect: Mood normal.         Speech: Speech normal.         Behavior: Behavior is cooperative.         Results Reviewed       Procedure Component Value Units Date/Time    FLU/RSV/COVID - if FLU/RSV clinically relevant (2hr TAT) [[id number]]  (Normal)  Collected: 11/28/24 0906    Lab Status: Final result Specimen: Nares from Nose Updated: 11/28/24 0959     SARS-CoV-2 Negative     INFLUENZA A PCR Negative     INFLUENZA B PCR Negative     RSV PCR Negative    Narrative:      This test has been performed using the CoV-2/Flu/RSV plus assay on the [employer] GeneXpert platform. This test has been validated by the  and verified by the performing laboratory.     This test is designed to amplify and detect the following: nucleocapsid (N), envelope (E), and RNA-dependent RNA polymerase (RdRP) genes of the SARS-CoV-2 genome; matrix (M), basic polymerase (PB2), and acidic protein (PA) segments of the influenza A genome; matrix (M) and non-structural protein (NS) segments of the influenza B genome, and the nucleocapsid genes of RSV A and RSV B.     Positive results are indicative of the presence of Flu A, Flu B, RSV, and/or SARS-CoV-2 RNA. Positive results for SARS-CoV-2 or suspected novel influenza should be reported to state, local, or federal health departments according to local reporting requirements.      All results should be assessed in conjunction with clinical presentation and other laboratory markers for clinical management.     FOR PEDIATRIC PATIENTS - copy/paste COVID Guidelines URL to browser: [email or web address]       Strep A PCR [[id number]]  (Normal) Collected: 11/28/24 0906    Lab Status: Final result Specimen: Throat Updated: 11/28/24 0947     STREP A PCR Not Detected    HS Troponin 0hr (reflex protocol) [[id number]]  (Normal) Collected: 11/28/24 0906    Lab Status: Final result Specimen: Blood from Arm, Right Updated: 11/28/24 0944     hs TnI 0hr <2 ng/L     Comprehensive metabolic panel [[id number]]  (Abnormal) Collected: 11/28/24 0906    Lab Status: Final result Specimen: Blood from Arm, Right Updated: 11/28/24 0938     Sodium 135 mmol/L      Potassium 3.2 mmol/L      Chloride 97 mmol/L      CO2  31 mmol/L      ANION GAP 7 mmol/L      BUN 17 mg/dL      Creatinine 0.70 mg/dL      Glucose 122 mg/dL      Calcium 9.3 mg/dL      AST 23 U/L      ALT 32 U/L      Alkaline Phosphatase 64 U/L      Total Protein 7.1 g/dL      Albumin 4.3 g/dL      Total Bilirubin 1.34 mg/dL      eGFR 96 ml/min/1.73sq m     Narrative:      [employer] Kid[employer] guidelines for Chronic Kidney Disease (CKD):     Stage 1 with normal or high GFR (GFR > 90 mL/min/1.73 square meters)    Stage 2 Mild CKD (GFR = 60-89 mL/min/1.73 square meters)    Stage 3A Moderate CKD (GFR = 45-59 mL/min/1.73 square meters)    Stage 3B Moderate CKD (GFR = 30-44 mL/min/1.73 square meters)    Stage 4 Severe CKD (GFR = 15-29 mL/min/1.73 square meters)    Stage 5 End Stage CKD (GFR <15 mL/min/1.73 square meters)  Note: GFR calculation is accurate only with a steady state creatinine    CBC and differential [[id number]]  (Abnormal) Collected: 11/28/24 0906    Lab Status: Final result Specimen: Blood from Arm, Right Updated: 11/28/24 0920     WBC 10.94 Thousand/uL      RBC 4.42 Million/uL      Hemoglobin 13.2 g/dL      Hematocrit 40.6 %      MCV 92 fL      MCH 29.9 pg      MCHC 32.5 g/dL      RDW 12.2 %      MPV 9.2 fL      Platelets 246 Thousands/uL      nRBC 0 /100 WBCs      Segmented % 73 %      Immature Grans % 0 %      Lymphocytes % 15 %      Monocytes % 11 %      Eosinophils Relative 1 %      Basophils Relative 0 %      Absolute Neutrophils 8.02 Thousands/µL      Absolute Immature Grans 0.03 Thousand/uL      Absolute Lymphocytes 1.62 Thousands/µL      Absolute Monocytes 1.15 Thousand/µL      Eosinophils Absolute 0.09 Thousand/µL      Basophils Absolute 0.03 Thousands/µL             XR chest 1 view portable    (Results Pending)       ECG 12 Lead Documentation Only    Date/Time: 11/28/2024 9:04 AM    Performed by: Yumiko Villafuerte PA-C  Authorized by: Yumiko Villafuerte PA-C    Indications / Diagnosis:  Tachycardia  ECG reviewed by me, the ED  Provider: yes    Patient location:  ED  Previous ECG:     Previous ECG:  Compared to current    Comparison ECG info:  7/17/24    Similarity:  No change    Comparison to cardiac monitor: Yes    Interpretation:     Interpretation: normal    Rate:     ECG rate:  107    ECG rate assessment: tachycardic    Rhythm:     Rhythm: sinus tachycardia    Ectopy:     Ectopy: none    QRS:     QRS axis:  Normal    QRS intervals:  Normal  Conduction:     Conduction: normal    ST segments:     ST segments:  Normal  T waves:     T waves: normal    Comments:      No STEMI.  QT//485      ED Medication and Procedure Management   Prior to Admission Medications   Prescriptions Last Dose Informant Patient Reported? Taking?   acetaminophen (TYLENOL) 500 mg tablet   No No   Sig: Take 2 tablets (1,000 mg total) by mouth every 6 (six) hours as needed for moderate pain   benzonatate (TESSALON PERLES) 100 mg capsule   No No   Sig: Take 1 capsule (100 mg total) by mouth 3 (three) times a day as needed for cough   lidocaine (Lidoderm) 5 %   No No   Sig: Apply 1 patch topically over 12 hours daily Remove & Discard patch within 12 hours or as directed by MD   lisinopril (ZESTRIL) 5 mg tablet   No No   Sig: Take 1 tablet (5 mg total) by mouth daily   methocarbamol (ROBAXIN) 500 mg tablet   No No   Sig: Take 1 tablet (500 mg total) by mouth 3 (three) times a day as needed for muscle spasms   naproxen (Naprosyn) 500 mg tablet   No No   Sig: Take 1 tablet (500 mg total) by mouth 2 (two) times a day with meals   ondansetron (Zofran ODT) 4 mg disintegrating tablet   No No   Sig: Take 1 tablet (4 mg total) by mouth every 6 (six) hours as needed for nausea or vomiting      Facility-Administered Medications: None     Discharge Medication List as of 11/28/2024 10:15 AM        START taking these medications    Details   albuterol (2.5 mg/3 mL) 0.083 % nebulizer solution Take 3 mL (2.5 mg total) by nebulization every 6 (six) hours as needed for wheezing  or shortness of breath, Starting Thu 11/28/2024, Normal      azithromycin (ZITHROMAX) 250 mg tablet Take 2 tablets today then 1 tablet daily x 4 days, Normal      !! benzonatate (TESSALON PERLES) 100 mg capsule Take 1 capsule (100 mg total) by mouth every 8 (eight) hours, Starting Thu 11/28/2024, Normal      predniSONE 20 mg tablet Take 2 tablets (40 mg total) by mouth daily for 4 days Do not start before November 29, 2024., Starting Fri 11/29/2024, Until Tue 12/3/2024, Normal       !! - Potential duplicate medications found. Please discuss with provider.        CONTINUE these medications which have NOT CHANGED    Details   acetaminophen (TYLENOL) 500 mg tablet Take 2 tablets (1,000 mg total) by mouth every 6 (six) hours as needed for moderate pain, Starting Thu 6/27/2024, Normal      !! benzonatate (TESSALON PERLES) 100 mg capsule Take 1 capsule (100 mg total) by mouth 3 (three) times a day as needed for cough, Starting Sun 7/17/2022, Normal      lidocaine (Lidoderm) 5 % Apply 1 patch topically over 12 hours daily Remove & Discard patch within 12 hours or as directed by MD, Starting Thu 6/27/2024, Normal      lisinopril (ZESTRIL) 5 mg tablet Take 1 tablet (5 mg total) by mouth daily, Starting Mon 7/18/2022, Until Wed 8/17/2022, Normal      methocarbamol (ROBAXIN) 500 mg tablet Take 1 tablet (500 mg total) by mouth 3 (three) times a day as needed for muscle spasms, Starting Thu 6/27/2024, Normal      naproxen (Naprosyn) 500 mg tablet Take 1 tablet (500 mg total) by mouth 2 (two) times a day with meals, Starting Thu 6/27/2024, Normal      ondansetron (Zofran ODT) 4 mg disintegrating tablet Take 1 tablet (4 mg total) by mouth every 6 (six) hours as needed for nausea or vomiting, Starting Sat 7/9/2022, Normal       !! - Potential duplicate medications found. Please discuss with provider.        Outpatient Discharge Orders   Nebulizer     ED SEPSIS DOCUMENTATION   Time reflects when diagnosis was documented in both MDM  as applicable and the Disposition within this note       Time User Action Codes Description Comment    11/28/2024 10:10 AM Yumiko Villafuerte [J06.9] Viral URI with cough     11/28/2024 10:10 AM Yumiko Villafuerte [J44.1] COPD exacerbation (HCC)     11/28/2024 10:15 AM Yumiko Villafuerte [E87.6] Hypokalemia                  Yumiko Villafuerte PA-C  11/28/24 1031

## 2024-11-29 LAB
ATRIAL RATE: 107 BPM
P AXIS: 70 DEGREES
PR INTERVAL: 146 MS
QRS AXIS: 81 DEGREES
QRSD INTERVAL: 86 MS
QT INTERVAL: 364 MS
QTC INTERVAL: 485 MS
T WAVE AXIS: 56 DEGREES
VENTRICULAR RATE: 107 BPM

## 2024-11-29 PROCEDURE — 93010 ELECTROCARDIOGRAM REPORT: CPT | Performed by: INTERNAL MEDICINE

## 2024-12-28 PROBLEM — J06.9 VIRAL URI WITH COUGH: Status: RESOLVED | Noted: 2024-11-28 | Resolved: 2024-12-28

## 2025-04-25 ENCOUNTER — OFFICE VISIT (OUTPATIENT)
Dept: FAMILY MEDICINE CLINIC | Facility: CLINIC | Age: 60
End: 2025-04-25
Payer: MEDICARE

## 2025-04-25 VITALS
HEART RATE: 77 BPM | WEIGHT: 185 LBS | TEMPERATURE: 98.2 F | DIASTOLIC BLOOD PRESSURE: 86 MMHG | OXYGEN SATURATION: 94 % | HEIGHT: 69 IN | BODY MASS INDEX: 27.4 KG/M2 | SYSTOLIC BLOOD PRESSURE: 180 MMHG

## 2025-04-25 DIAGNOSIS — Z12.31 ENCOUNTER FOR SCREENING MAMMOGRAM FOR BREAST CANCER: ICD-10-CM

## 2025-04-25 DIAGNOSIS — Z13.1 SCREENING FOR DIABETES MELLITUS: ICD-10-CM

## 2025-04-25 DIAGNOSIS — Z72.0 TOBACCO USE: ICD-10-CM

## 2025-04-25 DIAGNOSIS — Z13.6 SCREENING FOR CARDIOVASCULAR CONDITION: ICD-10-CM

## 2025-04-25 DIAGNOSIS — E55.9 VITAMIN D DEFICIENCY: ICD-10-CM

## 2025-04-25 DIAGNOSIS — R11.0 NAUSEA: ICD-10-CM

## 2025-04-25 DIAGNOSIS — S39.012A LUMBAR STRAIN, INITIAL ENCOUNTER: ICD-10-CM

## 2025-04-25 DIAGNOSIS — I10 PRIMARY HYPERTENSION: Primary | ICD-10-CM

## 2025-04-25 DIAGNOSIS — J44.1 COPD EXACERBATION (HCC): ICD-10-CM

## 2025-04-25 PROBLEM — R65.10 SIRS (SYSTEMIC INFLAMMATORY RESPONSE SYNDROME) (HCC): Status: RESOLVED | Noted: 2022-07-14 | Resolved: 2025-04-25

## 2025-04-25 PROBLEM — J96.01 ACUTE RESPIRATORY FAILURE WITH HYPOXIA (HCC): Status: RESOLVED | Noted: 2022-07-14 | Resolved: 2025-04-25

## 2025-04-25 PROCEDURE — 99204 OFFICE O/P NEW MOD 45 MIN: CPT

## 2025-04-25 PROCEDURE — 93000 ELECTROCARDIOGRAM COMPLETE: CPT

## 2025-04-25 RX ORDER — ALBUTEROL SULFATE 90 UG/1
2 INHALANT RESPIRATORY (INHALATION) EVERY 6 HOURS PRN
Qty: 8.5 G | Refills: 6 | Status: SHIPPED | OUTPATIENT
Start: 2025-04-25

## 2025-04-25 RX ORDER — LISINOPRIL 20 MG/1
20 TABLET ORAL DAILY
Qty: 30 TABLET | Refills: 1 | Status: SHIPPED | OUTPATIENT
Start: 2025-04-25 | End: 2025-05-25

## 2025-04-25 RX ORDER — BENZONATATE 100 MG/1
100 CAPSULE ORAL EVERY 8 HOURS
Qty: 21 CAPSULE | Refills: 0 | Status: SHIPPED | OUTPATIENT
Start: 2025-04-25

## 2025-04-25 RX ORDER — ALBUTEROL SULFATE 0.83 MG/ML
2.5 SOLUTION RESPIRATORY (INHALATION) EVERY 6 HOURS PRN
Qty: 75 ML | Refills: 0 | Status: SHIPPED | OUTPATIENT
Start: 2025-04-25

## 2025-04-25 RX ORDER — NICOTINE 21 MG/24HR
1 PATCH, TRANSDERMAL 24 HOURS TRANSDERMAL EVERY 24 HOURS
Qty: 28 PATCH | Refills: 5 | Status: SHIPPED | OUTPATIENT
Start: 2025-04-25

## 2025-04-25 RX ORDER — HYDROCHLOROTHIAZIDE 25 MG/1
25 TABLET ORAL DAILY
COMMUNITY
Start: 2025-04-09 | End: 2025-04-25 | Stop reason: SDUPTHER

## 2025-04-25 RX ORDER — ONDANSETRON 4 MG/1
4 TABLET, ORALLY DISINTEGRATING ORAL EVERY 6 HOURS PRN
Qty: 20 TABLET | Refills: 0 | Status: SHIPPED | OUTPATIENT
Start: 2025-04-25

## 2025-04-25 RX ORDER — HYDROCHLOROTHIAZIDE 25 MG/1
25 TABLET ORAL DAILY
Qty: 30 TABLET | Refills: 1 | Status: SHIPPED | OUTPATIENT
Start: 2025-04-25 | End: 2026-04-25

## 2025-04-25 RX ORDER — NAPROXEN 500 MG/1
500 TABLET ORAL 2 TIMES DAILY WITH MEALS
Qty: 30 TABLET | Refills: 3 | Status: SHIPPED | OUTPATIENT
Start: 2025-04-25

## 2025-04-25 NOTE — PROGRESS NOTES
Name: Jennifer De La Vega      : 1965      MRN: [id number]  Encounter Provider: Marline Weber DO  Encounter Date: 2025   [facility]  :  Assessment & Plan  Primary hypertension  Blood Pressure: (!) 180/86, uncontrolled  Was previously on Lisinopril 20mg and HCTZ 25mg daily, ran out of blood pressure medications a couple weeks ago.  EKG: Normal sinus rhythm    Plan:  Restart lisinopril 20 mg daily  Restart hydrochlorothiazide 25 mg daily  Recommend home blood pressure log  Recommend reducing caffeine intake from 2 cups/day to 1 cup/day  Recommend tobacco cessation, start nicotine patch daily  Recommend reducing salt intake  Orders:  •  lisinopril (ZESTRIL) 20 mg tablet; Take 1 tablet (20 mg total) by mouth daily  •  hydroCHLOROthiazide 25 mg tablet; Take 1 tablet (25 mg total) by mouth daily  •  POCT ECG    COPD exacerbation (HCC)  History of bronchitis, previously hospitalized for COPD exacerbation in .  Reports chronic cough since 2024 after COVID infection, chronic cough has not resolved.  Previously obtain PFT[facility] through [facility], which were negative in .  Patient has resumed smoking 1.5 pack/day for over 20 years.  Currently using albuterol nebulizer twice per week.    Plan:  Continue albuterol nebulizer as needed  Prescribed albuterol inhaler as needed for wheeze  Continue Tessalon Perles 100 mg 3 times daily as needed  Follow-up formal PFTs  Follow-up CT lung screening  Consider escalating to LABA/ICS every 4 hours as needed for wheeze and/or oral prednisone 40 mg daily for 5 days if wheeze/cough does not improve or worsens  Orders:  •  benzonatate (TESSALON PERLES) 100 mg capsule; Take 1 capsule (100 mg total) by mouth every 8 (eight) hours  •  albuterol (2.5 mg/3 mL) 0.083 % nebulizer solution; Take 3 mL (2.5 mg total) by nebulization every 6 (six) hours as needed for wheezing or shortness of breath  •  Complete PFT with  post bronchodilator; Future  •  albuterol (ProAir HFA) 90 mcg/act inhaler; Inhale 2 puffs every 6 (six) hours as needed for wheezing  •  CT lung screening program; Future    Vitamin D deficiency  Previous vitamin D 15.3 in 11/2020  Follow-up vitamin D levels and CBC levels  Orders:  •  CBC and differential; Future  •  Vitamin D 25 hydroxy; Future    Tobacco use  Smoking 1.5 packs/day, has smoked for over 20 years  Interested in tobacco cessation  Start nicotine patches daily  Follow-up CT lung screening  Orders:  •  nicotine (NICODERM CQ) 21 mg/24 hr TD 24 hr patch; Place 1 patch on the skin over 24 hours every 24 hours  •  albuterol (ProAir HFA) 90 mcg/act inhaler; Inhale 2 puffs every 6 (six) hours as needed for wheezing  •  CT lung screening program; Future    Nausea  Related to motion sickness.  Uses Zofran prior to buses/[employer]  Orders:  •  ondansetron (Zofran ODT) 4 mg disintegrating tablet; Take 1 tablet (4 mg total) by mouth every 6 (six) hours as needed for nausea or vomiting    Lumbar strain, initial encounter  Chronic lumbar back pain, well-controlled with lidocaine patches, naproxen 500 mg 1-2 times per week.  Continue current management  Orders:  •  naproxen (Naprosyn) 500 mg tablet; Take 1 tablet (500 mg total) by mouth 2 (two) times a day with meals    Encounter for screening mammogram for breast cancer    Orders:  •  Mammo screening bilateral w 3d and cad; Future    Screening for cardiovascular condition    Orders:  •  Lipid panel; Future    Screening for diabetes mellitus    Orders:  •  Comprehensive metabolic panel; Future          Depression Screening and Follow-up Plan: Patient was screened for depression during today's encounter. They screened negative with a PHQ-2 score of 0.      History of Present Illness   HPI  Pt presenting today to establish care with a new PCP. Previously follow with Dr. Soler but moved due to difficulty getting to PCP office.       Acute Concerns:  Reports chronic cough  since November 2024 after COVID.  Reports cough is productive and will have intermittent vomiting related to cough.  Denies any vomiting over the past 2 weeks related to cough. History of COPD. Reports inhalers stopped working for her. She has a nebulizer machine with albuterol neb which helps. Reports tessalon perles helped.     Ran out of blood pressure medications. Last medication was a couple a weeks ago. No chest pain. DUARTE. Alleviated with stopping and deep breathes. Can walk from [facility] to [facility] or [address] without stopping, which is about her baseline per patient.  No added salt. Does eat hoagies daily and other hidden salts.  Drinks 2 cups of caffeine daily.  Takes naproxen once per 1 to 2 weeks.  No vision changes.  Reports chronic cervicogenic headaches.    Works at [employer] at [employer]. Feels tired. No vision changes. Reports cervicogenic headache. No new changes to headaches.     Smoking 1.5ppd, been smoking over 20 years.   Chronic back pain, worse with bending over. Using Lidocaine 5% patches. Uses Naproxen 500mg- 1 pill every 1-2 weeks.     Gets motion sickness with car rides-ubers and buses- uses zofran to help.     Has a son in 20s. Doesn't get along with sister. Has anxiety related to family tension with sister.       PMHx:  HTN  COPD, no baseline O2  Tobacco use disorder       Medications:  Previously taking Lisinopril 20mg daily and HCTZ 25mg daily but ran out of meds a couple of weeks ago.    Tylenol: 1000mg q8h prn  Albuterol neb q6h prn   Benzonatate 100mg q8h prn cough  HCTZ 25mg daily  Lisinopril 20mg daily  Lidocaine 5% daily  Naproxen 500mg BID prn  Zofran 4mg q6h prn         Allergies:  None      Hospitalizations:  COPD exaccerbation in 2022  Previous deliveries       Surgeries:  Cholecystectomy- 15 years       Social Hx:  Caffeine use: Uses 2 cups of coffee  Smokes- 1.5 ppd of tobaccos. 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 Uses mom address for mail.  Drives? Or has access to a car?  No uses buses, uber, walking     Family Hx:  Cancer? -none  Stroke- none  HTN - Mom and dad  HLD- none  Diabetes -mom and dad  Father- heart on left side, passed in 70s  Maternal uncle- MI passed away in 40s from MI      Screenings:  HIV?  Up-to-date  Hep C?  Up-to-date  Pap smear?  Overdue  Mammo?  Overdue  Colonoscopy?  Overdue          Review of Systems   Constitutional:  Negative for chills, diaphoresis and fever.   Eyes:  Negative for visual disturbance.   Respiratory:  Positive for cough, shortness of breath (Unchanged DUARTE) and wheezing. Negative for chest tightness.    Cardiovascular:  Positive for leg swelling. Negative for chest pain and palpitations.   Gastrointestinal:  Negative for abdominal pain, nausea and vomiting.   Neurological:  Positive for headaches (Chronic). Negative for dizziness and light-headedness.       Objective   BP (!) 180/86 (BP Location: Left arm, Patient Position: Sitting, Cuff Size: Standard)   Pulse 77   Temp 98.2 °F (36.8 °C) (Temporal)   Ht 5' 9.02\" (1.753 m)   Wt 83.9 kg (185 lb)   SpO2 94%   BMI 27.31 kg/m²      Physical Exam  Vitals and nursing note reviewed.   Constitutional:       General: She is not in acute distress.     Appearance: She is well-developed.   HENT:      Head: Normocephalic and atraumatic.      Right Ear: Tympanic membrane, ear canal and external ear normal. There is no impacted cerumen.      Left Ear: Tympanic membrane, ear canal and external ear normal. There is no impacted cerumen.      Nose: Nose normal. No congestion or rhinorrhea.      Mouth/Throat:      Mouth: Mucous membranes are moist.      Pharynx: Oropharynx is clear. Posterior oropharyngeal erythema present. No oropharyngeal exudate.   Eyes:      Conjunctiva/sclera: Conjunctivae normal.   Cardiovascular:      Rate and Rhythm: Normal rate and " regular rhythm.      Heart sounds: No murmur heard.  Pulmonary:      Effort: Pulmonary effort is normal. No respiratory distress.      Breath sounds: Wheezing (Mild diffuse wheeze bilaterally) and rhonchi (Diffuse rhonchi) present.   Abdominal:      General: There is no distension.      Palpations: Abdomen is soft. There is no mass.      Tenderness: There is no abdominal tenderness. There is no guarding or rebound.      Hernia: No hernia is present.   Musculoskeletal:         General: No swelling.      Cervical back: Neck supple.      Right lower leg: Edema (1+ pitting edema) present.      Left lower leg: Edema (1+ pitting edema) present.   Skin:     General: Skin is warm and dry.      Capillary Refill: Capillary refill takes less than 2 seconds.   Neurological:      Mental Status: She is alert.   Psychiatric:         Mood and Affect: Mood normal.     Nutrition Assessment and Intervention:     Recommended completion of food recall journal    New Nutrition Prescription completed with patient      Other interventions: Avoid hidden salt, including hoagies    Tobacco and Toxic Substance Assessment and Intervention:     Tobacco use screening performed    Alcohol and drug use screening performed    Tobacco cessation medication protocol initiated      Other interventions: Start nicotine patch

## 2025-04-25 NOTE — ASSESSMENT & PLAN NOTE
Blood Pressure: (!) 180/86, uncontrolled  Was previously on Lisinopril 20mg and HCTZ 25mg daily, ran out of blood pressure medications a couple weeks ago.  EKG: Normal sinus rhythm    Plan:  Restart lisinopril 20 mg daily  Restart hydrochlorothiazide 25 mg daily  Recommend home blood pressure log  Recommend reducing caffeine intake from 2 cups/day to 1 cup/day  Recommend tobacco cessation, start nicotine patch daily  Recommend reducing salt intake  Orders:    lisinopril (ZESTRIL) 20 mg tablet; Take 1 tablet (20 mg total) by mouth daily    hydroCHLOROthiazide 25 mg tablet; Take 1 tablet (25 mg total) by mouth daily    POCT ECG

## 2025-04-25 NOTE — ASSESSMENT & PLAN NOTE
Smoking 1.5 packs/day, has smoked for over 20 years  Interested in tobacco cessation  Start nicotine patches daily  Follow-up CT lung screening  Orders:    nicotine (NICODERM CQ) 21 mg/24 hr TD 24 hr patch; Place 1 patch on the skin over 24 hours every 24 hours    albuterol (ProAir HFA) 90 mcg/act inhaler; Inhale 2 puffs every 6 (six) hours as needed for wheezing    CT lung screening program; Future

## 2025-04-25 NOTE — ASSESSMENT & PLAN NOTE
History of bronchitis, previously hospitalized for COPD exacerbation in 2022.  Reports chronic cough since November 2024 after COVID infection, chronic cough has not resolved.  Previously obtain PFTs through [facility], which were negative in 2022.  Patient has resumed smoking 1.5 pack/day for over 20 years.  Currently using albuterol nebulizer twice per week.    Plan:  Continue albuterol nebulizer as needed  Prescribed albuterol inhaler as needed for wheeze  Continue Tessalon Perles 100 mg 3 times daily as needed  Follow-up formal PFTs  Follow-up CT lung screening  Consider escalating to LABA/ICS every 4 hours as needed for wheeze and/or oral prednisone 40 mg daily for 5 days if wheeze/cough does not improve or worsens  Orders:    benzonatate (TESSALON PERLES) 100 mg capsule; Take 1 capsule (100 mg total) by mouth every 8 (eight) hours    albuterol (2.5 mg/3 mL) 0.083 % nebulizer solution; Take 3 mL (2.5 mg total) by nebulization every 6 (six) hours as needed for wheezing or shortness of breath    Complete PFT with post bronchodilator; Future    albuterol (ProAir HFA) 90 mcg/act inhaler; Inhale 2 puffs every 6 (six) hours as needed for wheezing    CT lung screening program; Future

## 2025-04-25 NOTE — PATIENT INSTRUCTIONS
Restart lisinopril 20 mg daily and hydrochlorothiazide 25 mg daily  Monitor blood pressure daily at home  Call to schedule mammogram, lung CT screening, and PFTs at [phone]  Get fasting blood work prior to next visit  Start Tessalon Perles 100 mg 3 times daily as needed for cough  Use nicotine patches daily, refilled nebulizer, albuterol inhaler, naproxen, Zofran, and lisinopril and hydrochlorothiazide  Follow-up in 2 weeks for annual physical and blood pressure recheck         Yes

## 2025-05-23 ENCOUNTER — OFFICE VISIT (OUTPATIENT)
Dept: FAMILY MEDICINE CLINIC | Facility: CLINIC | Age: 60
End: 2025-05-23
Payer: MEDICARE

## 2025-05-23 VITALS
HEIGHT: 69 IN | TEMPERATURE: 97.7 F | HEART RATE: 88 BPM | OXYGEN SATURATION: 97 % | BODY MASS INDEX: 26.78 KG/M2 | SYSTOLIC BLOOD PRESSURE: 138 MMHG | DIASTOLIC BLOOD PRESSURE: 92 MMHG | WEIGHT: 180.8 LBS

## 2025-05-23 DIAGNOSIS — Z12.11 SCREENING FOR COLORECTAL CANCER: ICD-10-CM

## 2025-05-23 DIAGNOSIS — G47.00 INSOMNIA, UNSPECIFIED TYPE: ICD-10-CM

## 2025-05-23 DIAGNOSIS — I10 PRIMARY HYPERTENSION: ICD-10-CM

## 2025-05-23 DIAGNOSIS — K21.9 GASTROESOPHAGEAL REFLUX DISEASE WITHOUT ESOPHAGITIS: ICD-10-CM

## 2025-05-23 DIAGNOSIS — Z23 ENCOUNTER FOR IMMUNIZATION: ICD-10-CM

## 2025-05-23 DIAGNOSIS — Z00.00 ANNUAL PHYSICAL EXAM: Primary | ICD-10-CM

## 2025-05-23 DIAGNOSIS — Z12.12 SCREENING FOR COLORECTAL CANCER: ICD-10-CM

## 2025-05-23 DIAGNOSIS — Z72.0 TOBACCO USE: ICD-10-CM

## 2025-05-23 DIAGNOSIS — R06.09 DOE (DYSPNEA ON EXERTION): ICD-10-CM

## 2025-05-23 PROCEDURE — 90472 IMMUNIZATION ADMIN EACH ADD: CPT

## 2025-05-23 PROCEDURE — 90677 PCV20 VACCINE IM: CPT

## 2025-05-23 PROCEDURE — 99396 PREV VISIT EST AGE 40-64: CPT | Performed by: FAMILY MEDICINE

## 2025-05-23 PROCEDURE — 90471 IMMUNIZATION ADMIN: CPT

## 2025-05-23 PROCEDURE — 99213 OFFICE O/P EST LOW 20 MIN: CPT | Performed by: FAMILY MEDICINE

## 2025-05-23 PROCEDURE — 90750 HZV VACC RECOMBINANT IM: CPT

## 2025-05-23 RX ORDER — FAMOTIDINE 40 MG/1
40 TABLET, FILM COATED ORAL
Qty: 90 TABLET | Refills: 0 | Status: SHIPPED | OUTPATIENT
Start: 2025-05-23

## 2025-05-23 RX ORDER — HYDROCHLOROTHIAZIDE 25 MG/1
25 TABLET ORAL DAILY
Qty: 90 TABLET | Refills: 0 | Status: SHIPPED | OUTPATIENT
Start: 2025-05-23

## 2025-05-23 RX ORDER — LISINOPRIL 20 MG/1
20 TABLET ORAL DAILY
Qty: 90 TABLET | Refills: 0 | Status: SHIPPED | OUTPATIENT
Start: 2025-05-23

## 2025-05-23 NOTE — ASSESSMENT & PLAN NOTE
Blood Pressure: 138/92, uncontrolled  Was previously on Lisinopril 20mg and HCTZ 25mg daily, ran out of blood pressure medications a couple weeks ago.  EKG: Normal sinus rhythm    Plan:  Restart lisinopril 20 mg daily  Restart hydrochlorothiazide 25 mg daily  Recommend home blood pressure log  Recommend reducing caffeine intake from 2 cups/day to 1 cup/day  Recommend tobacco cessation, start nicotine patch daily  Recommend reducing salt intake

## 2025-05-23 NOTE — PROGRESS NOTES
Adult Annual Physical  Name: Jennifer De La Vega      : 1965      MRN: [id number]  Encounter Provider: Marline Weber DO  Encounter Date: 2025   Encounter department: [facility]    :  Assessment & Plan  Annual physical exam  Preventative Blood work  Lipid panel for CVD-previously ordered  CMP for diabetes-previously ordered  HIV-nonreactive on 2020  Hep C- -nonreactive on 2020  Preventative Screenings:  Mammo-previously ordered encourage scheduling  Colon cancer screening-Cologuard ordered  Pap Smear- will need an updated pap  CT Lung cancer screening-previously ordered encourage scheduling  Depression Screening-negative  Counseling  Lifestyle changes  Smoking Cessation-continue nicotine patch  Immunizations  Prevnar and shingles ordered         Primary hypertension  Blood Pressure: 138/92., controlled   Currently on Lisinopril 20mg and HCTZ 25mg daily     Plan:  Continue lisinopril 20 mg daily  Continue hydrochlorothiazide 25 mg daily  Recommend home blood pressure log  Recommend tobacco cessation, start nicotine patch daily  Recommend reducing salt intake  Orders:  •  hydroCHLOROthiazide 25 mg tablet; Take 1 tablet (25 mg total) by mouth daily  •  lisinopril (ZESTRIL) 20 mg tablet; Take 1 tablet (20 mg total) by mouth daily    Screening for colorectal cancer    Orders:  •  Cologuard    Tobacco use  History of smoking 1.5 packs/day, has smoked for over 20 years, Has reduced smoking to 3 cigarettes per day, using the patch   Interested in tobacco cessation  Continue nicotine patches daily  Follow-up CT lung screening       Encounter for immunization    Orders:  •  Pneumococcal Conjugate Vaccine 20-valent (Pcv20)  •  Zoster Vaccine Recombinant IM    Gastroesophageal reflux disease without esophagitis  Reports increased nausea and has multiple risk factors for GERD, including tobacco use, and increased diet of red sauce, caffeine.    Plan:  Start Pepcid 40 mg  daily  Orders:  •  famotidine (PEPCID) 40 MG tablet; Take 1 tablet (40 mg total) by mouth daily at bedtime    DUARTE (dyspnea on exertion)  Reports dyspnea on exertion while going up a hill, unable to walk for 10 minutes straight without needing to stop.  Denies any chest pain associated with dyspnea on exertion.  History of stress test in 2022, which showed Upsloping ST-depression, non-diagnostic of ischemia with normal echo post-rest test.  Previously followed with cardiology for shortness of breath and chest pain.  Risk factors for CAD include hypertension, tobacco use, postmenopausal, history of uncle passing away in 40s due to MI    Plan:  Follow-up stress test, if abnormal consider cardiology referral  Follow-up lipid panel, previously ordered  Orders:  •  Echo stress test, exercise; Future    Insomnia, unspecified type  Reports sleeping only 3 hours per night with 5-6 times of waking up in the middle the night.  Suspect possible RONNI.    Follow-up sleep study  Orders:  •  Ambulatory Referral to Sleep Medicine; Future            Preventive Screenings:  - Diabetes Screening: screening up-to-date  - Cholesterol Screening: risks/benefits discussed and orders placed   - Hepatitis C screening: screening up-to-date   - HIV screening: screening up-to-date   - Breast cancer screening: risks/benefits discussed and orders placed   - Lung cancer screening: screening up-to-date          History of Present Illness   {?Quick Links Encounters * My Last Note * Last Note in Specialty * Snapshot * Since Last Visit * History :92496}  Adult Annual Physical:  Patient presents for annual physical. 61 yo with pmhx including HTN, tobacco use disorder, anxiety presents for annual physical and HTN follow up. She works at [employer] and there school year just ended.      She is currently taking Lisinopril 20mg and HCTZ 25mg daily. She has not been checking blood pressure at homes. Denies headaches, vision changes, dizziness, chest  pain,   Does get SOB once a while, can happen anytime. Does occasionally happen up a hill. Will walk up a couple of minutes, usually less than 10 minutes, and stop for 1 minutes, no chest pain during time. Does take bus off.   Does get positional lightheadedness when she bends over and up things. No falls or syncope.   No nausea or vomiting. Does take zofran for nausea. Has used it 8-9 times. Mostly when she eats food and motion sickness of taking bus rides.     Not using lidocaine for back pain  Using naxproxen every other day, using 1 tablet. Before bed. Not with food.     Was previously on ativan. Doesn't get along with sister. Has anxiety related to family tension with sister. Was interested in discussing anxiety medications/ativan at future visit. .     Diet and Physical Activity:  - Diet/Nutrition: poor diet. cheeseburgers, spagehetti, lasagna, hot dogs, mac and cheese. Bananas, strawberries. Most days eat protein.  - Exercise: walking, 5-7 times a week on average and 30-60 minutes on average.    General Health:  - Sleep: daytime hypersomnolence, unrefreshing sleep, sleeps poorly and 1-3 hours of sleep on average. Frequently waking up, about 5-6 times per night.  - Hearing: normal hearing bilateral ears.  - Vision: no vision problems, most recent eye exam > 1 year ago and wears glasses.  - Dental: no dental visits for > 1 year and brushes teeth three times daily.    /GYN Health:  - Follows with GYN: no.   - Menopause: postmenopausal.   - Contraception:. Last menstrual cycle 8 years, no hot flashes.        Review of Systems   Constitutional:  Negative for chills, diaphoresis, fatigue, fever and unexpected weight change.   HENT:  Negative for congestion, ear pain, hearing loss, rhinorrhea and sore throat.    Eyes:  Negative for pain and visual disturbance.   Respiratory:  Positive for shortness of breath. Negative for cough and wheezing.    Cardiovascular:  Negative for chest pain, palpitations and leg  swelling.   Gastrointestinal:  Positive for nausea. Negative for abdominal pain, blood in stool, constipation, diarrhea and vomiting.   Genitourinary:  Negative for dysuria and hematuria.   Musculoskeletal:  Positive for back pain (well controlled). Negative for arthralgias.   Skin:  Negative for color change and rash.   Neurological:  Negative for dizziness, syncope, light-headedness and headaches.   Psychiatric/Behavioral:  Positive for sleep disturbance. The patient is nervous/anxious.      Past Medical History   Past Medical History[1]  Past Surgical History[2]  Family History[3]   reports that she has been smoking cigarettes. She started smoking about 38 years ago. She has a 57.6 pack-year smoking history. She has never used smokeless tobacco. She reports that she does not currently use alcohol. She reports that she does not currently use drugs after having used the following drugs: Marijuana.  Current Outpatient Medications   Medication Instructions   • acetaminophen (TYLENOL) 1,000 mg, Oral, Every 6 hours PRN   • albuterol (ProAir HFA) 90 mcg/act inhaler 2 puffs, Inhalation, Every 6 hours PRN   • albuterol 2.5 mg, Nebulization, Every 6 hours PRN   • benzonatate (TESSALON PERLES) 100 mg, Oral, Every 8 hours   • famotidine (PEPCID) 40 mg, Oral, Daily at bedtime   • hydroCHLOROthiazide 25 mg, Oral, Daily   • lisinopril (ZESTRIL) 20 mg, Oral, Daily   • naproxen (NAPROSYN) 500 mg, Oral, 2 times daily with meals   • nicotine (NICODERM CQ) 21 mg/24 hr TD 24 hr patch 1 patch, Transdermal, Every 24 hours   • ondansetron (ZOFRAN ODT) 4 mg, Oral, Every 6 hours PRN   Allergies[4]   Medications Ordered Prior to Encounter[5]   Social History[6]    Objective {?Quick Links Trend Vitals * Enter New Vitals * Results Review * Timeline (Adult) * Labs * Imaging * Cardiology * Procedures * Lung Cancer Screening * Surgical eConsent :50750}  /92 (BP Location: Left arm, Patient Position: Sitting, Cuff Size: Standard)   Pulse  "88   Temp 97.7 °F (36.5 °C) (Temporal)   Ht 5' 9.02\" (1.753 m)   Wt 82 kg (180 lb 12.8 oz)   SpO2 97%   BMI 26.68 kg/m²     Physical Exam  Vitals and nursing note reviewed.   Constitutional:       General: She is not in acute distress.     Appearance: She is well-developed.   HENT:      Head: Normocephalic and atraumatic.      Right Ear: Tympanic membrane, ear canal and external ear normal. There is no impacted cerumen.      Left Ear: Tympanic membrane, ear canal and external ear normal. There is no impacted cerumen.      Nose: Nose normal. No congestion or rhinorrhea.      Mouth/Throat:      Mouth: Mucous membranes are moist.      Pharynx: Oropharynx is clear. No oropharyngeal exudate or posterior oropharyngeal erythema.     Eyes:      General: No scleral icterus.        Right eye: No discharge.         Left eye: No discharge.      Extraocular Movements: Extraocular movements intact.      Conjunctiva/sclera: Conjunctivae normal.      Pupils: Pupils are equal, round, and reactive to light.       Cardiovascular:      Rate and Rhythm: Normal rate and regular rhythm.      Heart sounds: Murmur heard.   Pulmonary:      Effort: Pulmonary effort is normal. No respiratory distress.      Breath sounds: Normal breath sounds.   Abdominal:      General: There is no distension.      Palpations: Abdomen is soft. There is no mass.      Tenderness: There is no abdominal tenderness. There is no guarding or rebound.      Hernia: No hernia is present.     Musculoskeletal:         General: No swelling.      Cervical back: Neck supple.      Right lower leg: Edema (1+) present.      Left lower leg: Edema (1+) present.     Skin:     General: Skin is warm and dry.      Capillary Refill: Capillary refill takes less than 2 seconds.     Neurological:      Mental Status: She is alert.     Psychiatric:         Mood and Affect: Mood normal.              [1]  Past Medical History:  Diagnosis Date   • Acute respiratory failure with hypoxia " ([facility]) 2022   • COPD (chronic obstructive pulmonary disease) with chronic bronchitis ([facility])    • Hypertension    • Psychiatric disorder    • SIRS (systemic inflammatory response syndrome) ([facility]) 2022   • Tobacco use disorder    [2]  Past Surgical History:  Procedure Laterality Date   • CHOLECYSTECTOMY LAPAROSCOPIC      gallbladder removal at age 45   [3]  Family History  Problem Relation Name Age of Onset   • Diabetes Mother     • Hypertension Mother     • Obesity Mother     • Diabetes Father     • Hypertension Father     • Obesity Father     • Other (CKD) Father          passed from renal failure 2/2 DM   • Depression Son     • Heart attack Maternal Uncle           in 40s due to MI   [4]  No Known Allergies[5]  Current Outpatient Medications on File Prior to Visit   Medication Sig Dispense Refill   • acetaminophen (TYLENOL) 500 mg tablet Take 2 tablets (1,000 mg total) by mouth every 6 (six) hours as needed for moderate pain 40 tablet 0   • albuterol (2.5 mg/3 mL) 0.083 % nebulizer solution Take 3 mL (2.5 mg total) by nebulization every 6 (six) hours as needed for wheezing or shortness of breath 75 mL 0   • albuterol (ProAir HFA) 90 mcg/act inhaler Inhale 2 puffs every 6 (six) hours as needed for wheezing 8.5 g 6   • benzonatate (TESSALON PERLES) 100 mg capsule Take 1 capsule (100 mg total) by mouth every 8 (eight) hours 21 capsule 0   • naproxen (Naprosyn) 500 mg tablet Take 1 tablet (500 mg total) by mouth 2 (two) times a day with meals 30 tablet 3   • nicotine (NICODERM CQ) 21 mg/24 hr TD 24 hr patch Place 1 patch on the skin over 24 hours every 24 hours 28 patch 5   • ondansetron (Zofran ODT) 4 mg disintegrating tablet Take 1 tablet (4 mg total) by mouth every 6 (six) hours as needed for nausea or vomiting 20 tablet 0     No current facility-administered medications on file prior to visit.   [6]  Social History  Tobacco Use   • Smoking status: Every Day     Current packs/day: 1.50     Average  packs/day: 1.5 packs/day for 38.4 years (57.6 ttl pk-yrs)     Types: Cigarettes     Start date: 1987   • Smokeless tobacco: Never   Vaping Use   • Vaping status: Former   • Substances: Nicotine   Substance and Sexual Activity   • Alcohol use: Not Currently   • Drug use: Not Currently     Types: Marijuana     Comment: last used 07/07/22

## 2025-05-23 NOTE — ASSESSMENT & PLAN NOTE
Blood Pressure: 138/92., controlled   Currently on Lisinopril 20mg and HCTZ 25mg daily     Plan:  Continue lisinopril 20 mg daily  Continue hydrochlorothiazide 25 mg daily  Recommend home blood pressure log  Recommend tobacco cessation, start nicotine patch daily  Recommend reducing salt intake  Orders:  •  hydroCHLOROthiazide 25 mg tablet; Take 1 tablet (25 mg total) by mouth daily  •  lisinopril (ZESTRIL) 20 mg tablet; Take 1 tablet (20 mg total) by mouth daily

## 2025-05-23 NOTE — ASSESSMENT & PLAN NOTE
History of smoking 1.5 packs/day, has smoked for over 20 years, Has reduced smoking to 3 cigarettes per day, using the patch   Interested in tobacco cessation  Continue nicotine patches daily  Follow-up CT lung screening

## 2025-05-23 NOTE — PROGRESS NOTES
"Name: Jennifer De La Vega      : 1965      MRN: [id number]  Encounter Provider: Marline Weber DO  Encounter Date: 2025   [facility]  :  Assessment & Plan  Primary hypertension  Blood Pressure: 138/92, uncontrolled  Was previously on Lisinopril 20mg and HCTZ 25mg daily, ran out of blood pressure medications a couple weeks ago.  EKG: Normal sinus rhythm    Plan:  Restart lisinopril 20 mg daily  Restart hydrochlorothiazide 25 mg daily  Recommend home blood pressure log  Recommend reducing caffeine intake from 2 cups/day to 1 cup/day  Recommend tobacco cessation, start nicotine patch daily  Recommend reducing salt intake              History of Present Illness {?Quick Links Encounters * My Last Note * Last Note in Specialty * Snapshot * Since Last Visit * History :53936}  HPI        Review of Systems    Objective {?Quick Links Trend Vitals * Enter New Vitals * Results Review * Timeline (Adult) * Labs * Imaging * Cardiology * Procedures * Lung Cancer Screening * Surgical eConsent :07977}  /92 (BP Location: Left arm, Patient Position: Sitting, Cuff Size: Standard)   Pulse 88   Temp 97.7 °F (36.5 °C) (Temporal)   Ht 5' 9.02\" (1.753 m)   Wt 82 kg (180 lb 12.8 oz)   SpO2 97%   BMI 26.68 kg/m²      Physical Exam  {Administrative / Billing Section (Optional):86429}  "

## 2025-05-23 NOTE — PATIENT INSTRUCTIONS
"Schedule mammogram through central scheduling- call [phone]  Schedule CT lung screening through central scheduling-call [phone]  Schedule Stress test- call [phone]  Sleep medicine will call the schedule   Please get fasting morning lab work done.  No eating or drinking for 8 to 12 hours.  Can have black coffee and water only.  Complete Cologuard at home and send it back.     Continue lisinopril 20 mg daily  Continue hydrochlorothiazide 25 mg daily  Start Pepcid 40mg daily       Patient Education     Routine physical for adults   The Basics   Written by the doctors and editors at [facility]   What is a physical? -- A physical is a routine visit, or \"check-up,\" with your doctor. You might also hear it called a \"wellness visit\" or \"preventive visit.\"  During each visit, the doctor will:   Ask about your physical and mental health   Ask about your habits, behaviors, and lifestyle   Do an exam   Give you vaccines if needed   Talk to you about any medicines you take   Give advice about your health   Answer your questions  Getting regular check-ups is an important part of taking care of your health. It can help your doctor find and treat any problems you have. But it's also important for preventing health problems.  A routine physical is different from a \"sick visit.\" A sick visit is when you see a doctor because of a health concern or problem. Since physicals are scheduled ahead of time, you can think about what you want to ask the doctor.  How often should I get a physical? -- It depends on your age and health. In general, for people age 21 years and older:   If you are younger than 50 years, you might be able to get a physical every 3 years.   If you are 50 years or older, your doctor might recommend a physical every year.  If you have an ongoing health condition, like diabetes or high blood pressure, your doctor will probably want to see you more often.  What happens during a physical? -- In general, each " "visit will include:   Physical exam - The doctor or nurse will check your height, weight, heart rate, and blood pressure. They will also look at your eyes and ears. They will ask about how you are feeling and whether you have any symptoms that bother you.   Medicines - It's a good idea to bring a list of all the medicines you take to each doctor visit. Your doctor will talk to you about your medicines and answer any questions. Tell them if you are having any side effects that bother you. You should also tell them if you are having trouble paying for any of your medicines.   Habits and behaviors - This includes:   Your diet   Your exercise habits   Whether you smoke, drink alcohol, or use drugs   Whether you are sexually active   Whether you feel safe at home  Your doctor will talk to you about things you can do to improve your health and lower your risk of health problems. They will also offer help and support. For example, if you want to quit smoking, they can give you advice and might prescribe medicines. If you want to improve your diet or get more physical activity, they can help you with this, too.   Lab tests, if needed - The tests you get will depend on your age and situation. For example, your doctor might want to check your:   Cholesterol   Blood sugar   Iron level   Vaccines - The recommended vaccines will depend on your age, health, and what vaccines you already had. Vaccines are very important because they can prevent certain serious or deadly infections.   Discussion of screening - \"Screening\" means checking for diseases or other health problems before they cause symptoms. Your doctor can recommend screening based on your age, risk, and preferences. This might include tests to check for:   Cancer, such as breast, prostate, cervical, ovarian, colorectal, prostate, lung, or skin cancer   Sexually transmitted infections, such as chlamydia and gonorrhea   Mental health conditions like depression and " anxiety  Your doctor will talk to you about the different types of screening tests. They can help you decide which screenings to have. They can also explain what the results might mean.   Answering questions - The physical is a good time to ask the doctor or nurse questions about your health. If needed, they can refer you to other doctors or specialists, too.  Adults older than 65 years often need other care, too. As you get older, your doctor will talk to you about:   How to prevent falling at home   Hearing or vision tests   Memory testing   How to take your medicines safely   Making sure that you have the help and support you need at home  All topics are updated as new evidence becomes available and our peer review process is complete.  This topic retrieved from [employer] on: May 02, 2024.  Topic 710136 Version 1.0  Release: 32.4.3 - C32.122  © 2024 UpToDate, Inc. and/or its affiliates. All rights reserved.  Consumer Information Use and Disclaimer   Disclaimer: This generalized information is a limited summary of diagnosis, treatment, and/or medication information. It is not meant to be comprehensive and should be used as a tool to help the user understand and/or assess potential diagnostic and treatment options. It does NOT include all information about conditions, treatments, medications, side effects, or risks that may apply to a specific patient. It is not intended to be medical advice or a substitute for the medical advice, diagnosis, or treatment of a health care provider based on the health care provider's examination and assessment of a patient's specific and unique circumstances. Patients must speak with a health care provider for complete information about their health, medical questions, and treatment options, including any risks or benefits regarding use of medications. This information does not endorse any treatments or medications as safe, effective, or approved for treating a specific patient.  UpToDate, Inc. and its affiliates disclaim any warranty or liability relating to this information or the use thereof.The use of this information is governed by the Terms of Use, available at https://www.wolterskluwer.com/en/know/clinical-effectiveness-terms. 2024© UpToDate, Inc. and its affiliates and/or licensors. All rights reserved.  Copyright   © 2024 UpToDate, Inc. and/or its affiliates. All rights reserved.

## 2025-05-27 ENCOUNTER — TELEPHONE (OUTPATIENT)
Dept: SLEEP CENTER | Facility: CLINIC | Age: 60
End: 2025-05-27

## 2025-05-27 ENCOUNTER — TRANSCRIBE ORDERS (OUTPATIENT)
Dept: SLEEP CENTER | Facility: CLINIC | Age: 60
End: 2025-05-27

## 2025-05-27 DIAGNOSIS — G47.00 INSOMNIA, UNSPECIFIED TYPE: Primary | ICD-10-CM

## 2025-05-27 NOTE — TELEPHONE ENCOUNTER
Referral placed for a home sleep study. Patient's insurance does not cover a home sleep study.      Order has been changed to in-lab diagnostic sleep study.     Ordering and co-signing providers notified.

## 2025-06-02 ENCOUNTER — APPOINTMENT (OUTPATIENT)
Dept: LAB | Facility: HOSPITAL | Age: 60
End: 2025-06-02
Payer: MEDICARE

## 2025-06-02 DIAGNOSIS — Z13.6 SCREENING FOR CARDIOVASCULAR CONDITION: ICD-10-CM

## 2025-06-02 DIAGNOSIS — Z13.1 SCREENING FOR DIABETES MELLITUS: ICD-10-CM

## 2025-06-02 DIAGNOSIS — E55.9 VITAMIN D DEFICIENCY: ICD-10-CM

## 2025-06-02 LAB
25(OH)D3 SERPL-MCNC: 20.5 NG/ML (ref 30–100)
ALBUMIN SERPL BCG-MCNC: 4.5 G/DL (ref 3.5–5)
ALP SERPL-CCNC: 58 U/L (ref 34–104)
ALT SERPL W P-5'-P-CCNC: 23 U/L (ref 7–52)
ANION GAP SERPL CALCULATED.3IONS-SCNC: 9 MMOL/L (ref 4–13)
AST SERPL W P-5'-P-CCNC: 18 U/L (ref 13–39)
BASOPHILS # BLD AUTO: 0.05 THOUSANDS/ÂΜL (ref 0–0.1)
BASOPHILS NFR BLD AUTO: 1 % (ref 0–1)
BILIRUB SERPL-MCNC: 0.99 MG/DL (ref 0.2–1)
BUN SERPL-MCNC: 22 MG/DL (ref 5–25)
CALCIUM SERPL-MCNC: 9.8 MG/DL (ref 8.4–10.2)
CHLORIDE SERPL-SCNC: 100 MMOL/L (ref 96–108)
CHOLEST SERPL-MCNC: 161 MG/DL (ref ?–200)
CO2 SERPL-SCNC: 31 MMOL/L (ref 21–32)
CREAT SERPL-MCNC: 0.7 MG/DL (ref 0.6–1.3)
EOSINOPHIL # BLD AUTO: 0.54 THOUSAND/ÂΜL (ref 0–0.61)
EOSINOPHIL NFR BLD AUTO: 8 % (ref 0–6)
ERYTHROCYTE [DISTWIDTH] IN BLOOD BY AUTOMATED COUNT: 12.6 % (ref 11.6–15.1)
GFR SERPL CREATININE-BSD FRML MDRD: 95 ML/MIN/1.73SQ M
GLUCOSE P FAST SERPL-MCNC: 94 MG/DL (ref 65–99)
HCT VFR BLD AUTO: 45.1 % (ref 34.8–46.1)
HDLC SERPL-MCNC: 40 MG/DL
HGB BLD-MCNC: 14.5 G/DL (ref 11.5–15.4)
IMM GRANULOCYTES # BLD AUTO: 0.01 THOUSAND/UL (ref 0–0.2)
IMM GRANULOCYTES NFR BLD AUTO: 0 % (ref 0–2)
LDLC SERPL CALC-MCNC: 107 MG/DL (ref 0–100)
LYMPHOCYTES # BLD AUTO: 2.23 THOUSANDS/ÂΜL (ref 0.6–4.47)
LYMPHOCYTES NFR BLD AUTO: 31 % (ref 14–44)
MCH RBC QN AUTO: 29.8 PG (ref 26.8–34.3)
MCHC RBC AUTO-ENTMCNC: 32.2 G/DL (ref 31.4–37.4)
MCV RBC AUTO: 93 FL (ref 82–98)
MONOCYTES # BLD AUTO: 0.66 THOUSAND/ÂΜL (ref 0.17–1.22)
MONOCYTES NFR BLD AUTO: 9 % (ref 4–12)
NEUTROPHILS # BLD AUTO: 3.69 THOUSANDS/ÂΜL (ref 1.85–7.62)
NEUTS SEG NFR BLD AUTO: 51 % (ref 43–75)
NONHDLC SERPL-MCNC: 121 MG/DL
NRBC BLD AUTO-RTO: 0 /100 WBCS
PLATELET # BLD AUTO: 216 THOUSANDS/UL (ref 149–390)
PMV BLD AUTO: 9.4 FL (ref 8.9–12.7)
POTASSIUM SERPL-SCNC: 4.2 MMOL/L (ref 3.5–5.3)
PROT SERPL-MCNC: 7.2 G/DL (ref 6.4–8.4)
RBC # BLD AUTO: 4.87 MILLION/UL (ref 3.81–5.12)
SODIUM SERPL-SCNC: 140 MMOL/L (ref 135–147)
TRIGL SERPL-MCNC: 72 MG/DL (ref ?–150)
WBC # BLD AUTO: 7.18 THOUSAND/UL (ref 4.31–10.16)

## 2025-06-02 PROCEDURE — 82306 VITAMIN D 25 HYDROXY: CPT

## 2025-06-02 PROCEDURE — 80053 COMPREHEN METABOLIC PANEL: CPT

## 2025-06-02 PROCEDURE — 80061 LIPID PANEL: CPT

## 2025-06-02 PROCEDURE — 85025 COMPLETE CBC W/AUTO DIFF WBC: CPT

## 2025-06-02 PROCEDURE — 36415 COLL VENOUS BLD VENIPUNCTURE: CPT

## 2025-06-05 ENCOUNTER — OFFICE VISIT (OUTPATIENT)
Dept: FAMILY MEDICINE CLINIC | Facility: CLINIC | Age: 60
End: 2025-06-05

## 2025-06-05 ENCOUNTER — RESULTS FOLLOW-UP (OUTPATIENT)
Dept: FAMILY MEDICINE CLINIC | Facility: CLINIC | Age: 60
End: 2025-06-05

## 2025-06-05 ENCOUNTER — HOSPITAL ENCOUNTER (OUTPATIENT)
Dept: PULMONOLOGY | Facility: HOSPITAL | Age: 60
End: 2025-06-05
Payer: MEDICARE

## 2025-06-05 VITALS
TEMPERATURE: 98 F | HEIGHT: 69 IN | HEART RATE: 100 BPM | BODY MASS INDEX: 27.11 KG/M2 | OXYGEN SATURATION: 93 % | SYSTOLIC BLOOD PRESSURE: 115 MMHG | DIASTOLIC BLOOD PRESSURE: 66 MMHG | WEIGHT: 183 LBS

## 2025-06-05 DIAGNOSIS — E55.9 VITAMIN D DEFICIENCY: ICD-10-CM

## 2025-06-05 DIAGNOSIS — J44.1 COPD EXACERBATION (HCC): ICD-10-CM

## 2025-06-05 DIAGNOSIS — F41.9 ANXIETY: Primary | ICD-10-CM

## 2025-06-05 PROCEDURE — 94760 N-INVAS EAR/PLS OXIMETRY 1: CPT

## 2025-06-05 PROCEDURE — 94726 PLETHYSMOGRAPHY LUNG VOLUMES: CPT | Performed by: INTERNAL MEDICINE

## 2025-06-05 PROCEDURE — 94060 EVALUATION OF WHEEZING: CPT | Performed by: INTERNAL MEDICINE

## 2025-06-05 PROCEDURE — 94729 DIFFUSING CAPACITY: CPT

## 2025-06-05 PROCEDURE — 94729 DIFFUSING CAPACITY: CPT | Performed by: INTERNAL MEDICINE

## 2025-06-05 PROCEDURE — 94726 PLETHYSMOGRAPHY LUNG VOLUMES: CPT

## 2025-06-05 PROCEDURE — 94060 EVALUATION OF WHEEZING: CPT

## 2025-06-05 RX ORDER — HYDROXYZINE HYDROCHLORIDE 10 MG/1
10 TABLET, FILM COATED ORAL EVERY 6 HOURS PRN
Qty: 30 TABLET | Refills: 1 | Status: SHIPPED | OUTPATIENT
Start: 2025-06-05

## 2025-06-05 RX ORDER — ALBUTEROL SULFATE 0.83 MG/ML
2.5 SOLUTION RESPIRATORY (INHALATION) ONCE
Status: COMPLETED | OUTPATIENT
Start: 2025-06-05 | End: 2025-06-05

## 2025-06-05 RX ORDER — ESCITALOPRAM OXALATE 5 MG/1
5 TABLET ORAL DAILY
Qty: 30 TABLET | Refills: 1 | Status: SHIPPED | OUTPATIENT
Start: 2025-06-05

## 2025-06-05 RX ORDER — CHOLECALCIFEROL (VITAMIN D3) 50 MCG
2000 TABLET ORAL DAILY
Qty: 90 TABLET | Refills: 0 | Status: SHIPPED | OUTPATIENT
Start: 2025-06-05

## 2025-06-05 RX ADMIN — ALBUTEROL SULFATE 2.5 MG: 2.5 SOLUTION RESPIRATORY (INHALATION) at 07:08
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
## 2025-06-06 ENCOUNTER — TELEPHONE (OUTPATIENT)
Age: 60
End: 2025-06-06

## 2025-06-06 NOTE — TELEPHONE ENCOUNTER
PA for Escitalopram (LEXAPRO) 5 mg tablet SUBMITTED to [facility]    via    [x]CMM-KEY: [id number]  []Surescripts-Case ID #   []Availity-Auth ID #   []Faxed to plan  []Other website   []Phone call Case ID #     [x]PA sent as URGENT    All office notes, labs and other pertaining documents and studies sent. Clinical questions answered. Awaiting determination from insurance company.     Turnaround time for your insurance to make a decision on your Prior Authorization can take 7-21 business days.

## 2025-06-13 NOTE — TELEPHONE ENCOUNTER
PA for Escitalopram (LEXAPRO) 5 mg tablet NOT REQUIRED     Reason (screenshot if applicable)          Patient advised by  - Pt aware and med was picked up on 6/6        [] MyChart Message  [] Phone call   []LMOM  []L/M to call office as no active Communication consent on file  []Unable to leave detailed message as VM not approved on Communication consent       Pharmacy advised by    []Fax  [x]Phone call- Paid claim received- and med was picked up on 6/6

## 2025-06-23 ENCOUNTER — TELEPHONE (OUTPATIENT)
Age: 60
End: 2025-06-23

## 2025-07-02 ENCOUNTER — HOSPITAL ENCOUNTER (OUTPATIENT)
Dept: NON INVASIVE DIAGNOSTICS | Facility: HOSPITAL | Age: 60
Discharge: HOME | End: 2025-07-02
Payer: MEDICARE

## 2025-07-02 VITALS
RESPIRATION RATE: 16 BRPM | OXYGEN SATURATION: 95 % | WEIGHT: 183 LBS | SYSTOLIC BLOOD PRESSURE: 108 MMHG | DIASTOLIC BLOOD PRESSURE: 82 MMHG | BODY MASS INDEX: 27.11 KG/M2 | HEIGHT: 69 IN | HEART RATE: 95 BPM

## 2025-07-02 DIAGNOSIS — R06.09 DOE (DYSPNEA ON EXERTION): ICD-10-CM

## 2025-07-02 LAB
CHEST PAIN STATEMENT: NORMAL
MAX DIASTOLIC BP: 80 MMHG
MAX HR PERCENT: 90 %
MAX HR: 137 BPM
MAX PREDICTED HEART RATE: 162 BPM
PROTOCOL NAME: NORMAL
RATE PRESSURE PRODUCT: NORMAL
SL CV LV EF: 60
SL CV STRESS RECOVERY BP: NORMAL MMHG
SL CV STRESS RECOVERY HR: 96 BPM
SL CV STRESS RECOVERY O2 SAT: 93 %
STRESS ANGINA INDEX: 0
STRESS BASELINE BP: NORMAL MMHG
STRESS BASELINE HR: 93 BPM
STRESS DUKE TREADMILL SCORE: 5
STRESS O2 SAT REST: 95 %
STRESS PEAK HR: 137 BPM
STRESS POST ESTIMATED WORKLOAD: 6.3 METS
STRESS POST EXERCISE DUR MIN: 4 MIN
STRESS POST EXERCISE DUR MIN: 4 MIN
STRESS POST EXERCISE DUR SEC: 30 SEC
STRESS POST EXERCISE DUR SEC: 30 SEC
STRESS POST O2 SAT PEAK: 85 %
STRESS POST PEAK BP: 140 MMHG
STRESS POST PEAK HR: 146 BPM
STRESS POST PEAK SYSTOLIC BP: 160 MMHG
STRESS ST DEPRESSION: 0.5 MM
TARGET HR FORMULA: NORMAL
TEST INDICATION: NORMAL

## 2025-07-02 PROCEDURE — 93350 STRESS TTE ONLY: CPT

## 2025-07-02 PROCEDURE — 93350 STRESS TTE ONLY: CPT | Performed by: INTERNAL MEDICINE

## 2025-08-25 PROBLEM — E87.6 HYPOKALEMIA: Status: ACTIVE | Noted: 2025-08-25

## 2025-08-27 PROBLEM — R03.1 INCIDENTAL FINDING OF LOW BLOOD PRESSURE: Status: ACTIVE | Noted: 2025-08-27
